# Patient Record
Sex: FEMALE | Race: WHITE | NOT HISPANIC OR LATINO | Employment: FULL TIME | URBAN - METROPOLITAN AREA
[De-identification: names, ages, dates, MRNs, and addresses within clinical notes are randomized per-mention and may not be internally consistent; named-entity substitution may affect disease eponyms.]

---

## 2018-03-20 ENCOUNTER — OFFICE VISIT (OUTPATIENT)
Dept: ENDOCRINOLOGY | Facility: CLINIC | Age: 38
End: 2018-03-20
Payer: OTHER GOVERNMENT

## 2018-03-20 VITALS
WEIGHT: 163.25 LBS | DIASTOLIC BLOOD PRESSURE: 76 MMHG | SYSTOLIC BLOOD PRESSURE: 110 MMHG | HEART RATE: 74 BPM | BODY MASS INDEX: 26.24 KG/M2 | HEIGHT: 66 IN

## 2018-03-20 DIAGNOSIS — E27.1 ADDISON'S DISEASE: ICD-10-CM

## 2018-03-20 DIAGNOSIS — Z46.81 INSULIN PUMP FITTING OR ADJUSTMENT: ICD-10-CM

## 2018-03-20 DIAGNOSIS — Z96.41 INSULIN PUMP STATUS: ICD-10-CM

## 2018-03-20 DIAGNOSIS — E10.8 TYPE 1 DIABETES MELLITUS WITH COMPLICATION: Primary | ICD-10-CM

## 2018-03-20 DIAGNOSIS — T38.0X5A ADVERSE EFFECT OF CORTICOSTEROIDS, INITIAL ENCOUNTER: ICD-10-CM

## 2018-03-20 PROCEDURE — 99999 PR PBB SHADOW E&M-NEW PATIENT-LVL IV: CPT | Mod: PBBFAC,,, | Performed by: NURSE PRACTITIONER

## 2018-03-20 PROCEDURE — 95251 CONT GLUC MNTR ANALYSIS I&R: CPT | Mod: ,,, | Performed by: NURSE PRACTITIONER

## 2018-03-20 PROCEDURE — 99204 OFFICE O/P NEW MOD 45 MIN: CPT | Mod: PBBFAC,PO | Performed by: NURSE PRACTITIONER

## 2018-03-20 PROCEDURE — 99204 OFFICE O/P NEW MOD 45 MIN: CPT | Mod: 25,S$PBB,, | Performed by: NURSE PRACTITIONER

## 2018-03-20 RX ORDER — SYRINGE WITH NEEDLE, 1 ML 25GX5/8"
SYRINGE, EMPTY DISPOSABLE MISCELLANEOUS
COMMUNITY
Start: 2017-12-19

## 2018-03-20 RX ORDER — HYDROCORTISONE 10 MG/1
TABLET ORAL
Qty: 360 TABLET | Refills: 3 | Status: SHIPPED | OUTPATIENT
Start: 2018-03-20 | End: 2018-11-30 | Stop reason: SDUPTHER

## 2018-03-20 RX ORDER — FLUDROCORTISONE ACETATE 0.1 MG/1
TABLET ORAL
Qty: 120 TABLET | Refills: 3 | Status: SHIPPED | OUTPATIENT
Start: 2018-03-20 | End: 2018-03-23 | Stop reason: SDUPTHER

## 2018-03-20 RX ORDER — FLUDROCORTISONE ACETATE 0.1 MG/1
TABLET ORAL
COMMUNITY
Start: 2017-12-19 | End: 2018-03-20 | Stop reason: SDUPTHER

## 2018-03-20 RX ORDER — LIRAGLUTIDE 6 MG/ML
INJECTION SUBCUTANEOUS
COMMUNITY
Start: 2018-03-12 | End: 2018-03-20 | Stop reason: SDUPTHER

## 2018-03-20 RX ORDER — LIRAGLUTIDE 6 MG/ML
1.8 INJECTION SUBCUTANEOUS DAILY
Qty: 9 SYRINGE | Refills: 3 | Status: SHIPPED | OUTPATIENT
Start: 2018-03-20 | End: 2018-11-30 | Stop reason: SDUPTHER

## 2018-03-20 RX ORDER — INSULIN ASPART 100 [IU]/ML
INJECTION, SOLUTION INTRAVENOUS; SUBCUTANEOUS
Qty: 6 VIAL | Refills: 3 | Status: SHIPPED | OUTPATIENT
Start: 2018-03-20 | End: 2018-11-30 | Stop reason: SDUPTHER

## 2018-03-20 RX ORDER — HYDROCORTISONE 10 MG/1
TABLET ORAL
COMMUNITY
Start: 2018-01-10 | End: 2018-03-20 | Stop reason: SDUPTHER

## 2018-03-20 RX ORDER — INSULIN ASPART 100 [IU]/ML
INJECTION, SOLUTION INTRAVENOUS; SUBCUTANEOUS
COMMUNITY
Start: 2018-01-10 | End: 2018-03-20 | Stop reason: SDUPTHER

## 2018-03-20 RX ORDER — PEN NEEDLE, DIABETIC 32GX 5/32"
NEEDLE, DISPOSABLE MISCELLANEOUS
COMMUNITY
Start: 2018-03-12

## 2018-03-20 NOTE — PROGRESS NOTES
Subjective:       Patient ID: Princess Garza is a 37 y.o. female.    Chief Complaint: Diabetes    HPI  New pt here to establish care. Pt is a 37 y.o. WF  with a diagnosis of Type 1 diabetes mellitus diagnosed approximately  1985 at age 5 yrs old , as well as chronic conditions pending review including Addisons disease .  Other pertinent medical and social information noted includes, but not limited to: none. Pt does where an Omnipod  Pump and a Dexcom sensor.  3 small children  (6, 3 and 6 mo).  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     Pt does not do formal carb counting.  She eats very low carbs so may only give herself 2 or 3 units plus a correction--so not sure what her CHO is.  However, she believes her ISF is closer to 1;20 vs the expected 1:45 based on projected TDD.  She is on both florinef and hydrocortisone for Addisons.  The hydro is given based on circadian rhythms--so she is basically getting pulses at different times of the day--which is not helping glucose patterns.  Very pleasant, high functioning.  Insulin pump and sensor both downloaded and reviewed.              Pump Model:Omni pod     Preferred Infusion Sets and Tubing:pods   Frequency of glucose checks a day:sensor BID and prn   Frequency of infusion set changes required:q 3 days    Average amount of daily insulin used:60     Basal rates:  12 mid----1 u/hr   4 am-------1.250  7 am-------0.85     CHO:none   ISF: 0   IOB: 4 hrs     Glucometer required to optimize pump function:Freestyle Regualr     Recommended back up plan in event of insulin pump hiatus: 25 units of basal-- rapid action 2-5 units with meals.         Review of Systems   Constitutional: Negative for activity change and fatigue.   HENT: Negative for hearing loss and trouble swallowing.    Eyes: Negative for photophobia and visual disturbance.        Last Eye Exam: April 2017   Respiratory: Negative for cough and shortness of breath.    Cardiovascular: Negative for chest pain and  palpitations.   Gastrointestinal: Negative for constipation and diarrhea.   Genitourinary: Negative for frequency and urgency.   Musculoskeletal: Negative for arthralgias and myalgias.   Skin: Negative for rash and wound.   Allergic/Immunologic: Negative for food allergies.   Neurological: Negative for weakness and numbness.   Psychiatric/Behavioral: Negative for sleep disturbance. The patient is not nervous/anxious.        Objective:      Physical Exam   Constitutional: She is oriented to person, place, and time. She appears well-developed and well-nourished.   Appears age, well groomed. NAD    HENT:   Head: Normocephalic and atraumatic.   Nose: Nose normal.   Mouth/Throat: Oropharynx is clear and moist.   Eyes: Conjunctivae and EOM are normal. Pupils are equal, round, and reactive to light.   Neck: Normal range of motion. Neck supple. No tracheal deviation present. No thyromegaly present.   Cardiovascular: Normal rate, regular rhythm, normal heart sounds and intact distal pulses.    Pulmonary/Chest: Effort normal and breath sounds normal.   Musculoskeletal: Normal range of motion. She exhibits no deformity.   Feet: no open wounds or lesions.  Good pedal care.   Vibratory sensation to feet intact bilaterally.    Neurological: She is alert and oriented to person, place, and time.   Skin: Skin is warm and dry.   Psychiatric: She has a normal mood and affect. Her behavior is normal. Judgment and thought content normal.       Assessment:       1. Type 1 diabetes mellitus with complication  Comprehensive metabolic panel    Hemoglobin A1c    Lipid panel    Microalbumin/creatinine urine ratio    TSH    HM DIABETES FOOT EXAM    Ambulatory Referral to Diabetes Education    VICTOZA 2-KAVON 0.6 mg/0.1 mL (18 mg/3 mL) PnIj    fludrocortisone (FLORINEF) 0.1 mg Tab    NOVOLOG U-100 INSULIN ASPART 100 unit/mL injection    hydrocortisone (CORTEF) 10 MG Tab      Hemoglobin A1c  Chronic-uncontrolled-see plan    2. Jeremias's disease   -chronic-refilled steroids-refer to Dr. Salinas    3. Adverse effect of corticosteroids, initial encounter  -chronic-causes labilitie in patterns    4. Insulin pump fitting or adjustment     5. Insulin pump status         Plan:       Encourage carb counting--if not an option encouraged some fixed meals.  Suspect she needs several basals based on the steroid pulses she is getting.      No CHO in pump--should have one.      NO ISF in pump ---should have one.      IOB 4 hrs--likely needs 3.     Also review Glucagon use.        ORDERS 03/20/2018  Next available--fasting cmp, lipids, tsh, a1c, urine m/c   Me in 6 weeks--pump and sensor download.     4 mo with me with a1c prior.   F/u Dr. Salinas-- approx 5-6 mo is fine.  A1c, cmp prior.

## 2018-03-22 ENCOUNTER — LAB VISIT (OUTPATIENT)
Dept: LAB | Facility: HOSPITAL | Age: 38
End: 2018-03-22
Attending: NURSE PRACTITIONER
Payer: OTHER GOVERNMENT

## 2018-03-22 DIAGNOSIS — E10.8 TYPE 1 DIABETES MELLITUS WITH COMPLICATION: ICD-10-CM

## 2018-03-22 LAB
ALBUMIN SERPL BCP-MCNC: 4.1 G/DL
ALP SERPL-CCNC: 74 U/L
ALT SERPL W/O P-5'-P-CCNC: 10 U/L
ANION GAP SERPL CALC-SCNC: 7 MMOL/L
AST SERPL-CCNC: 16 U/L
BILIRUB SERPL-MCNC: 0.7 MG/DL
BUN SERPL-MCNC: 13 MG/DL
CALCIUM SERPL-MCNC: 10.5 MG/DL
CHLORIDE SERPL-SCNC: 103 MMOL/L
CHOLEST SERPL-MCNC: 294 MG/DL
CHOLEST/HDLC SERPL: 4.3 {RATIO}
CO2 SERPL-SCNC: 29 MMOL/L
CREAT SERPL-MCNC: 0.9 MG/DL
EST. GFR  (AFRICAN AMERICAN): >60 ML/MIN/1.73 M^2
EST. GFR  (NON AFRICAN AMERICAN): >60 ML/MIN/1.73 M^2
ESTIMATED AVG GLUCOSE: 171 MG/DL
GLUCOSE SERPL-MCNC: 77 MG/DL
HBA1C MFR BLD HPLC: 7.6 %
HDLC SERPL-MCNC: 69 MG/DL
HDLC SERPL: 23.5 %
LDLC SERPL CALC-MCNC: 209 MG/DL
NONHDLC SERPL-MCNC: 225 MG/DL
POTASSIUM SERPL-SCNC: 4.3 MMOL/L
PROT SERPL-MCNC: 7.2 G/DL
SODIUM SERPL-SCNC: 139 MMOL/L
TRIGL SERPL-MCNC: 80 MG/DL
TSH SERPL DL<=0.005 MIU/L-ACNC: 0.79 UIU/ML

## 2018-03-22 PROCEDURE — 84443 ASSAY THYROID STIM HORMONE: CPT

## 2018-03-22 PROCEDURE — 80061 LIPID PANEL: CPT

## 2018-03-22 PROCEDURE — 80053 COMPREHEN METABOLIC PANEL: CPT

## 2018-03-22 PROCEDURE — 83036 HEMOGLOBIN GLYCOSYLATED A1C: CPT

## 2018-03-22 PROCEDURE — 36415 COLL VENOUS BLD VENIPUNCTURE: CPT | Mod: PO

## 2018-03-23 ENCOUNTER — PATIENT MESSAGE (OUTPATIENT)
Dept: ENDOCRINOLOGY | Facility: CLINIC | Age: 38
End: 2018-03-23

## 2018-03-23 DIAGNOSIS — E10.8 TYPE 1 DIABETES MELLITUS WITH COMPLICATION: ICD-10-CM

## 2018-03-23 RX ORDER — FLUDROCORTISONE ACETATE 0.1 MG/1
TABLET ORAL
Qty: 120 TABLET | Refills: 3 | Status: SHIPPED | OUTPATIENT
Start: 2018-03-23 | End: 2018-11-30 | Stop reason: SDUPTHER

## 2018-08-27 ENCOUNTER — PATIENT MESSAGE (OUTPATIENT)
Dept: ENDOCRINOLOGY | Facility: CLINIC | Age: 38
End: 2018-08-27

## 2018-08-27 ENCOUNTER — TELEPHONE (OUTPATIENT)
Dept: ENDOCRINOLOGY | Facility: CLINIC | Age: 38
End: 2018-08-27

## 2018-08-27 NOTE — TELEPHONE ENCOUNTER
Dr. Salinas, pt will be seeing you for [place]'s on 9/13/18 (Ro's pt).  Do you want labs prior to visit?

## 2018-08-27 NOTE — TELEPHONE ENCOUNTER
----- Message from Flora Bar sent at 8/27/2018  9:22 AM CDT -----  Contact: self  Type:  Patient Returning Call    Who Called:  self  Who Left Message for Patient:  Muna  Does the patient know what this is regarding?:  yes  Best Call Back Number:  [phone]  Additional Information:  Patient states she wants to reschedule her appointment. Patient states the date they gave her is passed her surgery date and needs to be before it. Thanks!

## 2018-08-27 NOTE — TELEPHONE ENCOUNTER
LM returning pt's call.  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  Call back to reschedule.

## 2018-08-27 NOTE — TELEPHONE ENCOUNTER
----- Message from Hillary Dominguez sent at 8/24/2018  4:39 PM CDT -----  Contact: pt  Pt is requesting to speak with nurse Muna Castellanos whom rescheduled her appt   Pt stated she won't be able to make the appt on 10-11-18 because she is having surgery on the 1st of October  Please call pt to advise  Call  Back   Thanks

## 2018-08-27 NOTE — TELEPHONE ENCOUNTER
----- Message from Michael Coulter sent at 8/27/2018  2:27 PM CDT -----  Contact: same  Unsuccessful call placed to office.  Patient called in and stated she keeps playing phone tag with office about her appt on 10/11/18?     Patient call back is [phone]

## 2018-09-13 ENCOUNTER — OFFICE VISIT (OUTPATIENT)
Dept: ENDOCRINOLOGY | Facility: CLINIC | Age: 38
End: 2018-09-13
Payer: OTHER GOVERNMENT

## 2018-09-13 VITALS
WEIGHT: 156.31 LBS | HEIGHT: 66 IN | BODY MASS INDEX: 25.12 KG/M2 | SYSTOLIC BLOOD PRESSURE: 118 MMHG | DIASTOLIC BLOOD PRESSURE: 60 MMHG | HEART RATE: 75 BPM

## 2018-09-13 DIAGNOSIS — E10.9 TYPE 1 DIABETES MELLITUS WITHOUT COMPLICATION: ICD-10-CM

## 2018-09-13 DIAGNOSIS — E27.1 ADDISON'S DISEASE: Primary | ICD-10-CM

## 2018-09-13 PROCEDURE — 99213 OFFICE O/P EST LOW 20 MIN: CPT | Mod: PBBFAC,PO | Performed by: INTERNAL MEDICINE

## 2018-09-13 PROCEDURE — 99214 OFFICE O/P EST MOD 30 MIN: CPT | Mod: S$PBB,,, | Performed by: INTERNAL MEDICINE

## 2018-09-13 PROCEDURE — 99999 PR PBB SHADOW E&M-EST. PATIENT-LVL III: CPT | Mod: PBBFAC,,, | Performed by: INTERNAL MEDICINE

## 2018-09-13 NOTE — PROGRESS NOTES
CHIEF COMPLAINT: Addisons  37 year old being seen as a new patient to me. Has been seeing Vivian for DM 1 management which was diagnosed at age 5. On Omnipod. On hydrocortisone 10-5-5-5-5 on avg. She adjusts also based on how she is feeling. On Florinef 0.1 mg daily. Addisons diagnosed in 2010.  Last hydrocortisone dose at 3 PM. No plans on other children. No N/V. States when it is low she gets palpitations. Does not have medic alert bracelet.             PAST MEDICAL HISTORY/PAST SURGICAL HISTORY:  Reviewed in [facility]    SOCIAL HISTORY: No T/A    FAMILY HISTORY:  Father has radiation induced hypothyroidism. Mother with hypothyroidism. No DM    MEDICATIONS/ALLERGIES: The patient's MedCard has been updated and reviewed.      ROS:   Constitutional: No recent significant weight change  Eyes: No recent visual changes  ENT: No dysphagia  Cardiovascular: Denies current anginal symptoms  Respiratory: Denies current respiratory difficulty  Gastrointestinal: Denies recent bowel disturbances  GenitoUrinary - No dysuria  Skin: No new skin rash  Neurologic: No focal neurologic complaints  Remainder ROS negative        PE:    GENERAL: Well developed, well nourished.  PSYCH:  appropriate mood and affect  EYES:  PERRL, EOM intact.  ENT: Nares patent, oropharynx clear, mucosa pink,   NECK: Supple, trachea midline, No palpable thyroid nodules.   CHEST: Resp even and unlabored, CTA bilateral.  CARDIAC: RRR, S1, S2 heard, no murmurs, rubs, S3, or S4  ABDOMEN: Soft, non-tender, non-distended;  No organomegaly  VASCULAR:  DP pulses +2/4 bilaterally, no edema  NEURO: Gait steady, CN II-VII grossly intact  SKIN: No areas of breakdown, no acanthosis nigracans.    LABS   Results for WALTER DIAZ (MRN [id number]) as of 9/13/2018 09:18   Ref. Range 3/22/2018 07:57 8/9/2018 11:23   Sodium Latest Ref Range: 136 - 145 mmol/L 139    Potassium Latest Ref Range: 3.5 - 5.1 mmol/L 4.3    Chloride Latest Ref Range: 95 - 110 mmol/L 103    CO2 Latest  Ref Range: 23 - 29 mmol/L 29    Anion Gap Latest Ref Range: 8 - 16 mmol/L 7 (L)    BUN, Bld Latest Ref Range: 6 - 20 mg/dL 13    Creatinine Latest Ref Range: 0.5 - 1.4 mg/dL 0.9    eGFR if non African American Latest Ref Range: >60 mL/min/1.73 m^2 >60.0    eGFR if African American Latest Ref Range: >60 mL/min/1.73 m^2 >60.0    Glucose Latest Ref Range: 70 - 110 mg/dL 77    Calcium Latest Ref Range: 8.7 - 10.5 mg/dL 10.5    Alkaline Phosphatase Latest Ref Range: 55 - 135 U/L 74    Total Protein Latest Ref Range: 6.0 - 8.4 g/dL 7.2    Albumin Latest Ref Range: 3.5 - 5.2 g/dL 4.1    Total Bilirubin Latest Ref Range: 0.1 - 1.0 mg/dL 0.7    AST Latest Ref Range: 10 - 40 U/L 16    ALT Latest Ref Range: 10 - 44 U/L 10    Triglycerides Latest Ref Range: 30 - 150 mg/dL 80    Cholesterol Latest Ref Range: 120 - 199 mg/dL 294 (H)    HDL Latest Ref Range: 40 - 75 mg/dL 69    LDL Cholesterol Latest Ref Range: 63.0 - 159.0 mg/dL 209.0 (H)    Total Cholesterol/HDL Ratio Latest Ref Range: 2.0 - 5.0  4.3    Hemoglobin A1C Latest Ref Range: 4.0 - 5.6 % 7.6 (H)    Estimated Avg Glucose Latest Ref Range: 68 - 131 mg/dL 171 (H)    TSH Latest Ref Range: 0.400 - 4.000 uIU/mL 0.786 0.481   T3, Free Latest Ref Range: 2.77 - 5.27 pg/mL  3.30   Free T4 Latest Ref Range: 0.78 - 2.19 ng/dL  1.07   Thyroperoxidase Antibodies Latest Ref Range: 0.0 - 9.0 IU/mL  0.8       ASSESSMENT/PLAN:  1. Adrenal Insufficiency- discussed that she is taking quite frequent doses of steroids. Could decrease to twice a day- either 20/10 or 15/5. Would also make it easier from a BG management standpoint. If twice a day did not work could go to 3/day dosing. Will do labs as seen below. Discussed wearing a medic alert bracelet. Discussed sick day precautions.     2. DM 1- following with Ro. Discussed r/o other autoimmune diseases      FOLLOWUP  8 AM renin, ACTH, IgA, TTG, CMP, CBC

## 2018-09-15 ENCOUNTER — LAB VISIT (OUTPATIENT)
Dept: LAB | Facility: HOSPITAL | Age: 38
End: 2018-09-15
Attending: INTERNAL MEDICINE
Payer: OTHER GOVERNMENT

## 2018-09-15 DIAGNOSIS — E27.1 ADDISON'S DISEASE: ICD-10-CM

## 2018-09-15 DIAGNOSIS — E10.9 TYPE 1 DIABETES MELLITUS WITHOUT COMPLICATION: ICD-10-CM

## 2018-09-15 LAB
ALBUMIN SERPL BCP-MCNC: 4.2 G/DL
ALP SERPL-CCNC: 67 U/L
ALT SERPL W/O P-5'-P-CCNC: 12 U/L
ANION GAP SERPL CALC-SCNC: 9 MMOL/L
AST SERPL-CCNC: 19 U/L
BASOPHILS # BLD AUTO: 0.03 K/UL
BASOPHILS NFR BLD: 0.6 %
BILIRUB SERPL-MCNC: 0.9 MG/DL
BUN SERPL-MCNC: 10 MG/DL
CALCIUM SERPL-MCNC: 9.8 MG/DL
CHLORIDE SERPL-SCNC: 99 MMOL/L
CO2 SERPL-SCNC: 28 MMOL/L
CREAT SERPL-MCNC: 0.9 MG/DL
DIFFERENTIAL METHOD: ABNORMAL
EOSINOPHIL # BLD AUTO: 0.1 K/UL
EOSINOPHIL NFR BLD: 1.3 %
ERYTHROCYTE [DISTWIDTH] IN BLOOD BY AUTOMATED COUNT: 12.9 %
EST. GFR  (AFRICAN AMERICAN): >60 ML/MIN/1.73 M^2
EST. GFR  (NON AFRICAN AMERICAN): >60 ML/MIN/1.73 M^2
GLUCOSE SERPL-MCNC: 129 MG/DL
HCT VFR BLD AUTO: 41.9 %
HGB BLD-MCNC: 13.7 G/DL
IGA SERPL-MCNC: 145 MG/DL
IMM GRANULOCYTES # BLD AUTO: 0.01 K/UL
IMM GRANULOCYTES NFR BLD AUTO: 0.2 %
LYMPHOCYTES # BLD AUTO: 2.3 K/UL
LYMPHOCYTES NFR BLD: 41.9 %
MCH RBC QN AUTO: 28.9 PG
MCHC RBC AUTO-ENTMCNC: 32.7 G/DL
MCV RBC AUTO: 88 FL
MONOCYTES # BLD AUTO: 0.6 K/UL
MONOCYTES NFR BLD: 10.2 %
NEUTROPHILS # BLD AUTO: 2.5 K/UL
NEUTROPHILS NFR BLD: 45.8 %
NRBC BLD-RTO: 0 /100 WBC
PLATELET # BLD AUTO: 365 K/UL
PMV BLD AUTO: 10.6 FL
POTASSIUM SERPL-SCNC: 3.7 MMOL/L
PROT SERPL-MCNC: 7.4 G/DL
RBC # BLD AUTO: 4.74 M/UL
SODIUM SERPL-SCNC: 136 MMOL/L
WBC # BLD AUTO: 5.4 K/UL

## 2018-09-15 PROCEDURE — 82024 ASSAY OF ACTH: CPT

## 2018-09-15 PROCEDURE — 83516 IMMUNOASSAY NONANTIBODY: CPT

## 2018-09-15 PROCEDURE — 85025 COMPLETE CBC W/AUTO DIFF WBC: CPT

## 2018-09-15 PROCEDURE — 36415 COLL VENOUS BLD VENIPUNCTURE: CPT | Mod: PO

## 2018-09-15 PROCEDURE — 82784 ASSAY IGA/IGD/IGG/IGM EACH: CPT

## 2018-09-15 PROCEDURE — 84244 ASSAY OF RENIN: CPT

## 2018-09-15 PROCEDURE — 80053 COMPREHEN METABOLIC PANEL: CPT

## 2018-09-18 LAB — ACTH PLAS-MCNC: 18 PG/ML

## 2018-09-19 LAB
RENIN PLAS-CCNC: 22 NG/ML/H
TTG IGA SER IA-ACNC: 4 UNITS

## 2018-09-27 ENCOUNTER — TELEPHONE (OUTPATIENT)
Dept: ENDOCRINOLOGY | Facility: CLINIC | Age: 38
End: 2018-09-27

## 2018-09-27 NOTE — TELEPHONE ENCOUNTER
Ro manages her Diabetes. Let me let her give the pump recs.     I can give steroid recommendations when I Get back.   Is there a form they want this on or just create documentation within epic

## 2018-09-27 NOTE — TELEPHONE ENCOUNTER
Dr Salinas pt is having an abdominal plasty with hernia repair on 10/10, pt will be in surgery for about 3 hours, please advise on cortef and pump instructions prior to surgery

## 2018-09-27 NOTE — TELEPHONE ENCOUNTER
----- Message from RT Tova sent at 9/27/2018 10:00 AM CDT -----  Contact: MARISA Del Real, [phone] [facility] Pre Admit Dept  MARISA Del Real, [phone] [facility] Pre Admit Dept, requesting surgery instructions for the pt's insulin pump and cortef dosing prior to Sx, thanks.

## 2018-09-27 NOTE — TELEPHONE ENCOUNTER
----- Message from Piyush Thompson sent at 9/27/2018 10:25 AM CDT -----  Contact: [facility], Eloisa Amin want to speak with a nurse regarding clarification on insulin pump before patient surgery please call back at [phone]

## 2018-09-27 NOTE — TELEPHONE ENCOUNTER
Just a message in epic will work  The Nurse for the surgeons office was advised I would call back Monday with your advise

## 2018-09-27 NOTE — TELEPHONE ENCOUNTER
----- Message from Star Prabhakar sent at 9/27/2018 11:09 AM CDT -----  Contact: Nasima with [facility]   Nasima with [facility] is returning a call from nurse Ramirez. Please call back at [phone]

## 2018-10-01 ENCOUNTER — TELEPHONE (OUTPATIENT)
Dept: ENDOCRINOLOGY | Facility: CLINIC | Age: 38
End: 2018-10-01

## 2018-10-01 NOTE — TELEPHONE ENCOUNTER
----- Message from Stan Diaz sent at 10/1/2018  3:21 PM CDT -----  Type: Needs Medical Advice    Who Called:  Cindi Martin's office  Best Call Back Number: [phone]  Additional Information: Procedure is Outpatient

## 2018-10-01 NOTE — TELEPHONE ENCOUNTER
Spoke with Joaquina, advised Ro Gayle will be making the adjustments, advised she will return on 10/2, will call back with orders once received

## 2018-10-01 NOTE — TELEPHONE ENCOUNTER
----- Message from Loraine White sent at 10/1/2018  1:47 PM CDT -----  Contact: Cindi Puente [phone] with Dr Peña Martin is calling to speak with nurse concerning this patient as she is saying that the nurse was going to call her back/patient has a preop appt tomorrow  10 02 18

## 2018-10-01 NOTE — TELEPHONE ENCOUNTER
----- Message from Flora Bar sent at 10/1/2018  2:20 PM CDT -----  Contact: Kristie Puente with Dr Das's office is calling back regarding patient about the instructions for the insulin pump. This is for outpatient surgery. Please call Cindi at [phone]. Thanks!

## 2018-10-02 RX ORDER — INSULIN GLARGINE 100 [IU]/ML
INJECTION, SOLUTION SUBCUTANEOUS
Qty: 1 BOX | Refills: 0 | Status: SHIPPED | OUTPATIENT
Start: 2018-10-02

## 2018-10-02 RX ORDER — PEN NEEDLE, DIABETIC 30 GX3/16"
NEEDLE, DISPOSABLE MISCELLANEOUS
Qty: 50 EACH | Refills: 0 | Status: SHIPPED | OUTPATIENT
Start: 2018-10-02

## 2018-10-02 NOTE — TELEPHONE ENCOUNTER
----- Message from Edwardopaul Aki sent at 10/2/2018 10:05 AM CDT -----  Contact: Cindi with Dr Mario Puente with Dr Martin office called, she need to speak with a nurse regarding mutual patient. Please call back at [phone]

## 2018-10-02 NOTE — TELEPHONE ENCOUNTER
Ro, please advise pump orders for pts pre op  Pt is having surgery 10/10. Dr office doing pre op this week

## 2018-10-02 NOTE — TELEPHONE ENCOUNTER
Please advise pt I do not want her wearing pump through surgery. This is a big surgery and I don't know how with it patient will be to operate pump--and I suspect a lot of adjustments will need to be done with stress steroids.      Therefore:    Pt to take 25 units of Lantus or Levemir qhs----starting the night before surgery--if she is concerned out taking 25, she can cut it down to 20 units--but 25 is still hedging on conservative side.    Once pt eating I advise 4 units of Novolog or Humalog with meals plus a ss --    Novolog/or Humalog/o USE ONLY    Dose is based on level of glucose before meals only (meaning no food or drink for 4 hours). This dose may be added to a standard meal dose if ordered    150-200=+2  201-250=+4  251-300=+6  301-350=+8  351-400=+10       I will send in rx for Lantus or Levemir,    (whatever is preferred to pharmacy)     In patient recommendations:    25 lantus or levermir qhs,   novolog or Humalog 4 units with meals plus ss.

## 2018-10-03 ENCOUNTER — TELEPHONE (OUTPATIENT)
Dept: ENDOCRINOLOGY | Facility: CLINIC | Age: 38
End: 2018-10-03

## 2018-10-03 NOTE — TELEPHONE ENCOUNTER
Dr Salinas, pt states she Is suppose to get 100mg IV on day of surgery q 8 hours  Please advise if this is ok

## 2018-10-03 NOTE — TELEPHONE ENCOUNTER
----- Message from Albertina Horvath sent at 10/3/2018  8:48 AM CDT -----    Patient  Is calling  To   Speak to the  Nurse about   Pt  Surgery  Next  Week , pt stated  She  Needs  Her  DR who will  Be  Doing the  surgery  instructions on insulin pump/   Pt  Stated  Written orders   Need to  Be sent // please call /[phone]  Pt will  Have  Information  About  Fax #

## 2018-10-03 NOTE — TELEPHONE ENCOUNTER
Spoke with Eloisa advised Dose of steroid day of surgery increased to 100mg Iv on call to Or and 8 hours post op.

## 2018-10-03 NOTE — TELEPHONE ENCOUNTER
Spoke with pt, states she is willing to have to higher blood sugars for a few days than have adrenal crisis. Pt preferred the 100mg dose on call to OR and 8 hours post op  Please advise if this is ok/

## 2018-10-03 NOTE — TELEPHONE ENCOUNTER
Let her know that the recommendations changed and they are suggesting decreased doses based on certain surgeries. I would prefer IV but if they are unable to give it there then she would have to get PO. The other option is that she can ask the surgeon if they can do it at a facility that can do IV.   If she still prefers 100 IV, that may be excessive but we can suggest that dose.

## 2018-10-10 PROBLEM — R11.0 NAUSEA: Status: ACTIVE | Noted: 2018-10-10

## 2018-10-10 PROBLEM — K42.9 UMBILICAL HERNIA WITHOUT OBSTRUCTION AND WITHOUT GANGRENE: Status: ACTIVE | Noted: 2018-10-10

## 2018-11-15 ENCOUNTER — LAB VISIT (OUTPATIENT)
Dept: LAB | Facility: HOSPITAL | Age: 38
End: 2018-11-15
Attending: NURSE PRACTITIONER
Payer: OTHER GOVERNMENT

## 2018-11-15 DIAGNOSIS — E27.1 ADDISON'S DISEASE: ICD-10-CM

## 2018-11-15 DIAGNOSIS — E10.8 TYPE 1 DIABETES MELLITUS WITH COMPLICATION: ICD-10-CM

## 2018-11-15 LAB
ALBUMIN SERPL BCP-MCNC: 4.1 G/DL
ALP SERPL-CCNC: 99 U/L
ALT SERPL W/O P-5'-P-CCNC: 9 U/L
ANION GAP SERPL CALC-SCNC: 6 MMOL/L
AST SERPL-CCNC: 15 U/L
BILIRUB SERPL-MCNC: 0.6 MG/DL
BUN SERPL-MCNC: 14 MG/DL
CALCIUM SERPL-MCNC: 9.4 MG/DL
CHLORIDE SERPL-SCNC: 101 MMOL/L
CO2 SERPL-SCNC: 29 MMOL/L
CREAT SERPL-MCNC: 0.8 MG/DL
EST. GFR  (AFRICAN AMERICAN): >60 ML/MIN/1.73 M^2
EST. GFR  (NON AFRICAN AMERICAN): >60 ML/MIN/1.73 M^2
ESTIMATED AVG GLUCOSE: 157 MG/DL
GLUCOSE SERPL-MCNC: 178 MG/DL
HBA1C MFR BLD HPLC: 7.1 %
POTASSIUM SERPL-SCNC: 4.2 MMOL/L
PROT SERPL-MCNC: 7.7 G/DL
SODIUM SERPL-SCNC: 136 MMOL/L

## 2018-11-15 PROCEDURE — 80053 COMPREHEN METABOLIC PANEL: CPT

## 2018-11-15 PROCEDURE — 36415 COLL VENOUS BLD VENIPUNCTURE: CPT | Mod: PO

## 2018-11-15 PROCEDURE — 83036 HEMOGLOBIN GLYCOSYLATED A1C: CPT

## 2018-11-30 ENCOUNTER — OFFICE VISIT (OUTPATIENT)
Dept: ENDOCRINOLOGY | Facility: CLINIC | Age: 38
End: 2018-11-30
Payer: OTHER GOVERNMENT

## 2018-11-30 VITALS
WEIGHT: 158.75 LBS | BODY MASS INDEX: 25.51 KG/M2 | HEIGHT: 66 IN | DIASTOLIC BLOOD PRESSURE: 72 MMHG | RESPIRATION RATE: 18 BRPM | SYSTOLIC BLOOD PRESSURE: 112 MMHG

## 2018-11-30 DIAGNOSIS — E10.8 TYPE 1 DIABETES MELLITUS WITH COMPLICATION: Primary | ICD-10-CM

## 2018-11-30 DIAGNOSIS — T38.0X5A ADRENAL CORTICAL STEROIDS CAUSING ADVERSE EFFECT IN THERAPEUTIC USE: ICD-10-CM

## 2018-11-30 DIAGNOSIS — E78.5 HYPERLIPIDEMIA, UNSPECIFIED HYPERLIPIDEMIA TYPE: ICD-10-CM

## 2018-11-30 DIAGNOSIS — Z46.81 INSULIN PUMP FITTING OR ADJUSTMENT: ICD-10-CM

## 2018-11-30 DIAGNOSIS — E27.1 ADDISON'S DISEASE: ICD-10-CM

## 2018-11-30 PROCEDURE — 99214 OFFICE O/P EST MOD 30 MIN: CPT | Mod: PBBFAC,PO | Performed by: NURSE PRACTITIONER

## 2018-11-30 PROCEDURE — 95251 CONT GLUC MNTR ANALYSIS I&R: CPT | Mod: ,,, | Performed by: NURSE PRACTITIONER

## 2018-11-30 PROCEDURE — 99212 OFFICE O/P EST SF 10 MIN: CPT | Mod: 25,S$PBB,, | Performed by: NURSE PRACTITIONER

## 2018-11-30 PROCEDURE — 99999 PR PBB SHADOW E&M-EST. PATIENT-LVL IV: CPT | Mod: PBBFAC,,, | Performed by: NURSE PRACTITIONER

## 2018-11-30 RX ORDER — INSULIN ASPART 100 [IU]/ML
INJECTION, SOLUTION INTRAVENOUS; SUBCUTANEOUS
Qty: 6 VIAL | Refills: 3 | Status: SHIPPED | OUTPATIENT
Start: 2018-11-30

## 2018-11-30 RX ORDER — HYDROCORTISONE 10 MG/1
TABLET ORAL
Qty: 360 TABLET | Refills: 3 | Status: SHIPPED | OUTPATIENT
Start: 2018-11-30 | End: 2023-06-27 | Stop reason: DRUGHIGH

## 2018-11-30 RX ORDER — FLUDROCORTISONE ACETATE 0.1 MG/1
TABLET ORAL
Qty: 120 TABLET | Refills: 3 | Status: SHIPPED | OUTPATIENT
Start: 2018-11-30 | End: 2018-11-30 | Stop reason: SDUPTHER

## 2018-11-30 RX ORDER — FLUDROCORTISONE ACETATE 0.1 MG/1
TABLET ORAL
Qty: 120 TABLET | Refills: 3 | Status: SHIPPED | OUTPATIENT
Start: 2018-11-30

## 2018-11-30 RX ORDER — HYDROCORTISONE 10 MG/1
TABLET ORAL
Qty: 360 TABLET | Refills: 3 | Status: SHIPPED | OUTPATIENT
Start: 2018-11-30 | End: 2018-11-30 | Stop reason: SDUPTHER

## 2018-11-30 RX ORDER — INSULIN ASPART 100 [IU]/ML
INJECTION, SOLUTION INTRAVENOUS; SUBCUTANEOUS
Qty: 6 VIAL | Refills: 3 | Status: SHIPPED | OUTPATIENT
Start: 2018-11-30 | End: 2018-11-30 | Stop reason: SDUPTHER

## 2018-11-30 RX ORDER — LIRAGLUTIDE 6 MG/ML
1.8 INJECTION SUBCUTANEOUS DAILY
Qty: 9 SYRINGE | Refills: 3 | Status: SHIPPED | OUTPATIENT
Start: 2018-11-30 | End: 2019-05-09 | Stop reason: ALTCHOICE

## 2018-11-30 RX ORDER — LIRAGLUTIDE 6 MG/ML
1.8 INJECTION SUBCUTANEOUS DAILY
Qty: 9 SYRINGE | Refills: 3 | Status: SHIPPED | OUTPATIENT
Start: 2018-11-30 | End: 2018-11-30 | Stop reason: SDUPTHER

## 2018-11-30 NOTE — PROGRESS NOTES
Subjective:       Patient ID: Princess Garza is a 38 y.o. female.    Chief Complaint: Follow-up (3 month/ labs prior)    HPI  Pt is a 38 y.o. WF  with a diagnosis of Type 1 diabetes mellitus diagnosed approximately  1985 at age 5 yrs old , as well as chronic conditions pending review including Addisons disease .  Other pertinent medical and social information noted includes, but not limited to: none. Pt does where an Omnipod  Pump and a Dexcom sensor.  3 small children  (6, 3 and 6 mo).  [employer] at [employer].     Pt does not do formal carb counting.  She eats very low carbs so may only give herself 2 or 3 units plus a correction--She does not use the bolus wizard in pump--nor are the cho or ISF entered in the pump.  At last visit I wanted pt pt come back in a few weeks to review logs and optimize these settings but she did not.  Primarily had a lot of personal issues, including a hernia surgery which interfered with f/u visits. Dexcom sensor is downloaded.  Overall, numbers are fairly smooth, especially considering freq steroids for Addisons'   She is on both florinef and hydrocortisone for Addisons.  The hydro is given based on circadian rhythms--so she is basically getting pulses at different times of the day--which is not helping glucose patterns.  Very pleasant, high functioning.  Insulin pump and sensor both downloaded and reviewed.        See Media todays date.       Pump Model:Omni pod     Preferred Infusion Sets and Tubing:pods   Frequency of glucose checks a day:sensor BID and prn   Frequency of infusion set changes required:q 3 days    Average amount of daily insulin used:60     Basal rates:  12 mid----1 u/hr   4 am-------1.250  7 am-------0.85     CHO:none   ISF: 0   IOB: 4 hrs     Glucometer required to optimize pump function:Freestyle Regualr     Recommended back up plan in event of insulin pump hiatus: 25 units of basal-- rapid action 2-5 units with meals.         Review of Systems    Constitutional: Negative for activity change and fatigue.   HENT: Negative for hearing loss and trouble swallowing.    Eyes: Negative for photophobia and visual disturbance.        Last Eye Exam: April 2017   Respiratory: Negative for cough and shortness of breath.    Cardiovascular: Negative for chest pain and palpitations.   Gastrointestinal: Negative for constipation and diarrhea.   Genitourinary: Negative for frequency and urgency.   Musculoskeletal: Negative for arthralgias and myalgias.   Skin: Negative for rash and wound.   Allergic/Immunologic: Negative for food allergies.   Neurological: Negative for weakness and numbness.   Psychiatric/Behavioral: Negative for sleep disturbance. The patient is not nervous/anxious.        Objective:      Physical Exam   Constitutional: She is oriented to person, place, and time. She appears well-developed and well-nourished.   Appears age, well groomed. NAD    HENT:   Head: Normocephalic and atraumatic.   Nose: Nose normal.   Mouth/Throat: Oropharynx is clear and moist.   Eyes: Conjunctivae and EOM are normal. Pupils are equal, round, and reactive to light.   Neck: Normal range of motion. Neck supple. No tracheal deviation present. No thyromegaly present.   Cardiovascular: Normal rate, regular rhythm, normal heart sounds and intact distal pulses.   Pulmonary/Chest: Effort normal and breath sounds normal.   Musculoskeletal: Normal range of motion. She exhibits no deformity.   Feet:    Neurological: She is alert and oriented to person, place, and time.   Skin: Skin is warm and dry.   Psychiatric: She has a normal mood and affect. Her behavior is normal. Judgment and thought content normal.       Hemoglobin A1C   Date Value Ref Range Status   11/15/2018 7.1 (H) 4.0 - 5.6 % Final     Comment:     ADA Screening Guidelines:  5.7-6.4%  Consistent with prediabetes  >or=6.5%  Consistent with diabetes  High levels of fetal hemoglobin interfere with the HbA1C  assay. Heterozygous  hemoglobin variants (HbS, HgC, etc)do  not significantly interfere with this assay.   However, presence of multiple variants may affect accuracy.     09/27/2018 8.0 (H) 0.0 - 5.6 % Final     Comment:     Reference Interval:  5.0 - 5.6 Normal   5.7 - 6.4 High Risk   > 6.5 Diabetic    Hgb A1c results are standardized based on the (NGSP) National   Glycohemoglobin Standardization Program.    Hemoglobin A1C levels are related to mean serum/plasma glucose   during the preceding 2-3 months.        03/22/2018 7.6 (H) 4.0 - 5.6 % Final     Comment:     According to ADA guidelines, hemoglobin A1c <7.0% represents  optimal control in non-pregnant diabetic patients. Different  metrics may apply to specific patient populations.   Standards of Medical Care in Diabetes-2016.  For the purpose of screening for the presence of diabetes:  <5.7%     Consistent with the absence of diabetes  5.7-6.4%  Consistent with increasing risk for diabetes   (prediabetes)  >or=6.5%  Consistent with diabetes  Currently, no consensus exists for use of hemoglobin A1c  for diagnosis of diabetes for children.  This Hemoglobin A1c assay has significant interference with fetal   hemoglobin   (HbF). The results are invalid for patients with abnormal amounts of   HbF,   including those with known Hereditary Persistence   of Fetal Hemoglobin. Heterozygous hemoglobin variants (HbAS, HbAC,   HbAD, HbAE, HbA2) do not significantly interfere with this assay;   however, presence of multiple variants in a sample may impact the %   interference.         Chemistry        Component Value Date/Time     11/15/2018 1011    K 4.2 11/15/2018 1011     11/15/2018 1011    CO2 29 11/15/2018 1011    BUN 14 11/15/2018 1011    CREATININE 0.8 11/15/2018 1011     (H) 11/15/2018 1011        Component Value Date/Time    CALCIUM 9.4 11/15/2018 1011    ALKPHOS 99 11/15/2018 1011    AST 15 11/15/2018 1011    ALT 9 (L) 11/15/2018 1011    BILITOT 0.6 11/15/2018 1011     ESTGFRAFRICA >60.0 11/15/2018 1011    EGFRNONAA >60.0 11/15/2018 1011        Lab Results   Component Value Date    LDLCALC 209.0 (H) 03/22/2018     Lab Results   Component Value Date    TSH 0.481 08/09/2018       Assessment:       1. Type 1 diabetes mellitus with complication  Hemoglobin A1c    Lipid panel     DIABETES FOOT EXAM    blood sugar diagnostic Strp    fludrocortisone (FLORINEF) 0.1 mg Tab    hydrocortisone (CORTEF) 10 MG Tab    NOVOLOG U-100 INSULIN ASPART 100 unit/mL injection    VICTOZA 2-KAVON 0.6 mg/0.1 mL (18 mg/3 mL) PnIj    DISCONTINUED: blood sugar diagnostic Strp    DISCONTINUED: VICTOZA 2-KAVON 0.6 mg/0.1 mL (18 mg/3 mL) PnIj    DISCONTINUED: NOVOLOG U-100 INSULIN ASPART 100 unit/mL injection    DISCONTINUED: fludrocortisone (FLORINEF) 0.1 mg Tab    DISCONTINUED: hydrocortisone (CORTEF) 10 MG Tab  Chronic-stable-no change    2. Insulin pump fitting or adjustment     3. Adrenal cortical steroids causing adverse effect in therapeutic use  hydrocortisone sodium succinate (SOLU-CORTEF) 100 mg SolR    DISCONTINUED: hydrocortisone sodium succinate (SOLU-CORTEF) 100 mg SolR   4. Baker's disease  Chronic-no changes-f/u Dr. Salinas   5. Hyperlipidemia, unspecified hyperlipidemia type  Chronic-uncontrolled-see plan          Plan:            No CHO in pump--should have one.      NO ISF in pump ---should have one.      IOB 4 hrs--likely needs 3.       Strongly encourace ACE for prophylaxis and absolutely a statin. But will recheck chem and Lipids again next visit.        ORDERS 11/30/2018     F/u Dr. Salinas--next available--labs pending     F/u me in 6 mo w/ fasting lipids, a1c prior

## 2019-03-15 ENCOUNTER — TELEPHONE (OUTPATIENT)
Dept: ENDOCRINOLOGY | Facility: CLINIC | Age: 39
End: 2019-03-15

## 2019-03-15 DIAGNOSIS — E83.52 HYPERCALCEMIA: Primary | ICD-10-CM

## 2019-03-15 NOTE — TELEPHONE ENCOUNTER
Let her know that I reviewed the labs. The calcium was slightly elevated    Have her do a CMP, PTH, Phos

## 2019-03-15 NOTE — TELEPHONE ENCOUNTER
----- Message from Cuba Lane sent at 3/15/2019  1:33 PM CDT -----  Pt stopped by clinic and stated she had labs done recently that her PCP advised you view for guidance

## 2019-03-15 NOTE — TELEPHONE ENCOUNTER
spoke with pt who verbalized understanding of Dr. Salinas's message:  Let her know that I reviewed the labs. The calcium was slightly elevated     Have her do a CMP, PTH, Phos    Pt will do labs either tomorrow at [facility] or at [facility] lab

## 2019-04-01 DIAGNOSIS — T38.0X5A ADRENAL CORTICAL STEROIDS CAUSING ADVERSE EFFECT IN THERAPEUTIC USE: ICD-10-CM

## 2019-04-01 NOTE — TELEPHONE ENCOUNTER
----- Message from Ashley Farrell sent at 4/1/2019 11:49 AM CDT -----  Contact: Patient  Patient needs to speak with someone in the office possibly today regarding a medication  hydrocortisone sodium succinate (SOLU-CORTEF) 100 mg SolR    Please contact patient to advise [phone] (home)

## 2019-04-16 ENCOUNTER — TELEPHONE (OUTPATIENT)
Dept: ENDOCRINOLOGY | Facility: CLINIC | Age: 39
End: 2019-04-16

## 2019-04-16 NOTE — TELEPHONE ENCOUNTER
Spoke with pt, rescheduled labs and office visit due to work conflict, aware of new date time and locations

## 2019-04-16 NOTE — TELEPHONE ENCOUNTER
----- Message from Alisa Bocanegra sent at 4/16/2019 12:31 PM CDT -----  Contact: Princess  Type: Needs Medical Advice    Who Called:  patient  Best Call Back Number: [phone]  Additional Information: patient has to go out of town for work & can't make her appt on 5/16--would like to speak with someone before r/s--please advise--thank you

## 2019-05-07 ENCOUNTER — LAB VISIT (OUTPATIENT)
Dept: LAB | Facility: HOSPITAL | Age: 39
End: 2019-05-07
Attending: NURSE PRACTITIONER
Payer: OTHER GOVERNMENT

## 2019-05-07 DIAGNOSIS — E10.8 TYPE 1 DIABETES MELLITUS WITH COMPLICATION: ICD-10-CM

## 2019-05-07 LAB
CHOLEST SERPL-MCNC: 240 MG/DL (ref 120–199)
CHOLEST/HDLC SERPL: 3.3 {RATIO} (ref 2–5)
ESTIMATED AVG GLUCOSE: 174 MG/DL (ref 68–131)
HBA1C MFR BLD HPLC: 7.7 % (ref 4–5.6)
HDLC SERPL-MCNC: 73 MG/DL (ref 40–75)
HDLC SERPL: 30.4 % (ref 20–50)
LDLC SERPL CALC-MCNC: 150.2 MG/DL (ref 63–159)
NONHDLC SERPL-MCNC: 167 MG/DL
TRIGL SERPL-MCNC: 84 MG/DL (ref 30–150)

## 2019-05-07 PROCEDURE — 80061 LIPID PANEL: CPT

## 2019-05-07 PROCEDURE — 36415 COLL VENOUS BLD VENIPUNCTURE: CPT | Mod: PO

## 2019-05-07 PROCEDURE — 83036 HEMOGLOBIN GLYCOSYLATED A1C: CPT

## 2019-05-09 ENCOUNTER — OFFICE VISIT (OUTPATIENT)
Dept: ENDOCRINOLOGY | Facility: CLINIC | Age: 39
End: 2019-05-09
Payer: OTHER GOVERNMENT

## 2019-05-09 ENCOUNTER — TELEPHONE (OUTPATIENT)
Dept: ENDOCRINOLOGY | Facility: CLINIC | Age: 39
End: 2019-05-09

## 2019-05-09 VITALS
HEART RATE: 74 BPM | WEIGHT: 163.5 LBS | BODY MASS INDEX: 26.28 KG/M2 | DIASTOLIC BLOOD PRESSURE: 70 MMHG | HEIGHT: 66 IN | SYSTOLIC BLOOD PRESSURE: 104 MMHG

## 2019-05-09 DIAGNOSIS — Z46.81 INSULIN PUMP FITTING OR ADJUSTMENT: ICD-10-CM

## 2019-05-09 DIAGNOSIS — R53.82 CHRONIC FATIGUE: ICD-10-CM

## 2019-05-09 DIAGNOSIS — E27.1 ADDISON'S DISEASE: ICD-10-CM

## 2019-05-09 DIAGNOSIS — Z96.41 INSULIN PUMP STATUS: ICD-10-CM

## 2019-05-09 DIAGNOSIS — E78.5 DYSLIPIDEMIA: ICD-10-CM

## 2019-05-09 DIAGNOSIS — E10.8 TYPE 1 DIABETES MELLITUS WITH COMPLICATION: Primary | ICD-10-CM

## 2019-05-09 DIAGNOSIS — T38.0X5A ADRENAL CORTICAL STEROIDS CAUSING ADVERSE EFFECT IN THERAPEUTIC USE: ICD-10-CM

## 2019-05-09 PROCEDURE — 99215 PR OFFICE/OUTPT VISIT, EST, LEVL V, 40-54 MIN: ICD-10-PCS | Mod: S$PBB,,, | Performed by: NURSE PRACTITIONER

## 2019-05-09 PROCEDURE — 95251 PR GLUCOSE MONITOR, 72 HOUR, PHYS INTERP: ICD-10-PCS | Mod: ,,, | Performed by: NURSE PRACTITIONER

## 2019-05-09 PROCEDURE — 95251 CONT GLUC MNTR ANALYSIS I&R: CPT | Mod: ,,, | Performed by: NURSE PRACTITIONER

## 2019-05-09 PROCEDURE — 99215 OFFICE O/P EST HI 40 MIN: CPT | Mod: S$PBB,,, | Performed by: NURSE PRACTITIONER

## 2019-05-09 PROCEDURE — 99999 PR PBB SHADOW E&M-EST. PATIENT-LVL IV: CPT | Mod: PBBFAC,,, | Performed by: NURSE PRACTITIONER

## 2019-05-09 PROCEDURE — 99999 PR PBB SHADOW E&M-EST. PATIENT-LVL IV: ICD-10-PCS | Mod: PBBFAC,,, | Performed by: NURSE PRACTITIONER

## 2019-05-09 PROCEDURE — 99214 OFFICE O/P EST MOD 30 MIN: CPT | Mod: PBBFAC,PO | Performed by: NURSE PRACTITIONER

## 2019-05-09 RX ORDER — LISINOPRIL 2.5 MG/1
2.5 TABLET ORAL DAILY
Qty: 90 TABLET | Refills: 3 | Status: SHIPPED | OUTPATIENT
Start: 2019-05-09 | End: 2021-09-27 | Stop reason: ALTCHOICE

## 2019-05-09 RX ORDER — CYANOCOBALAMIN 1000 UG/ML
1000 INJECTION, SOLUTION INTRAMUSCULAR; SUBCUTANEOUS
COMMUNITY
Start: 2019-03-21 | End: 2021-09-27 | Stop reason: ALTCHOICE

## 2019-05-09 RX ORDER — ATORVASTATIN CALCIUM 10 MG/1
10 TABLET, FILM COATED ORAL DAILY
Qty: 90 TABLET | Refills: 3 | Status: SHIPPED | OUTPATIENT
Start: 2019-05-09 | End: 2021-09-27 | Stop reason: ALTCHOICE

## 2019-05-09 NOTE — TELEPHONE ENCOUNTER
----- Message from Johnnie Salinas DO sent at 5/9/2019  1:51 PM CDT -----  Can do TSH, FT4, T3, CMP, CBC  ----- Message -----  From: Ashley Patel LPN  Sent: 5/9/2019   1:35 PM  To: Johnnie Salinas DO        ----- Message -----  From: DWAIN Porter,ANP-C  Sent: 5/9/2019  12:41 PM  To: Brad Blair Staff (Endo)    Pt of ours--Type 1 and Jeremias's.  Anyway, pt with horrible,horrible fatigue.  States marked cold intolerance, straw like hair, dry skin, -wt gain, though following strict low carb diet, and exercising--everything that is congruent with hypothyroidism.    Primary did a pretty good work up and TFT's WNL w/exception of Free T 3, and only scantly off.        You are seeing her early June.  Do you want to order some other labs before your visit??

## 2019-05-09 NOTE — PROGRESS NOTES
Subjective:       Patient ID: Princess Garza is a 38 y.o. female.    Chief Complaint: routine f/u Type 1 DM/worsening fatigue.     HPI  Pt is a 38 y.o. WF  with a diagnosis of Type 1 diabetes mellitus diagnosed approximately  1985 at age 5 yrs old , as well as chronic conditions pending review including Addisons disease .  Other pertinent medical and social information noted includes, but not limited to: none. Pt does where an Omnipod  Pump and a Dexcom sensor.  3 small children  (6, 3 and 6 mo).  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     Interim Events: Pt does not do formal carb counting.  She eats very low carbs so may only give herself 2 or 3 units plus a correction--She does not use the bolus wizard in pump--nor are the cho or ISF entered in the pump.  Dexcom sensor is downloaded.  Biggest issue is markedly worsened fatigue.  TFT's, Vitamin B-D checked per primary and numbers are grossly WNL.  Goes to bed by 10, sleeps well, up at 5-5:30. Can barely get through day with out nap.  States marked cold intolerance, straw like hair, dry skin, -wt gain, though following strict low carb diet, and exercising--everything that is congruent with hypothyroidism.  DM control has worsened. Insulin pump and sensor are downloaded.  Overall, numbers are fairly smooth, especially considering freq steroids for Addisons'   She is on both florinef and hydrocortisone for Addisons.  The hydro is given based on circadian rhythms--so she is basically getting pulses at different times of the day--which is not helping glucose patterns.     She stopped victoza b/c she didn't think it was benefitting much, but now requests weekly GLP. Very pleasant, high functioning.  Insulin pump and sensor both downloaded and reviewed.        See Media todays date.       Pump Model:Omni pod     Preferred Infusion Sets and Tubing:pods   Frequency of glucose checks a day:sensor BID and prn   Frequency of infusion set changes required:q 3 days    Average amount  of daily insulin used:60     Basal rates:  12 mid----1.1  u/hr   5L30 am-------1.3 u/hr   9 am------------1.6 u/hr   5 pm------------1.25 u/hr   CHO:none   ISF: 0   IOB: 4 hrs     Glucometer required to optimize pump function:Freestyle Regualr     Recommended back up plan in event of insulin pump hiatus: 25 units of basal-- rapid action 2-5 units with meals.         Review of Systems   Constitutional: Positive for fatigue (in bed at 10, no TV, no snoring, up at 5-5:30. some days taking double dose of steroids, ) and unexpected weight change. Negative for activity change.   HENT: Negative for hearing loss and trouble swallowing.    Eyes: Negative for photophobia and visual disturbance.        Last Eye Exam: April 2017   Respiratory: Negative for cough and shortness of breath.    Cardiovascular: Negative for chest pain and palpitations.        Reports low pulse.    Gastrointestinal: Negative for constipation and diarrhea.   Endocrine: Positive for cold intolerance.   Genitourinary: Negative for frequency and urgency.   Musculoskeletal: Negative for arthralgias and myalgias.   Skin: Negative for rash and wound.        Hair loss, dry hair,      Allergic/Immunologic: Negative for food allergies.   Neurological: Negative for weakness and numbness.   Psychiatric/Behavioral: Negative for sleep disturbance. The patient is not nervous/anxious.        Objective:      Physical Exam   Constitutional: She is oriented to person, place, and time. She appears well-developed and well-nourished.   Appears age, well groomed. NAD    HENT:   Head: Normocephalic and atraumatic.   Nose: Nose normal.   Mouth/Throat: Oropharynx is clear and moist.   Eyes: Pupils are equal, round, and reactive to light. Conjunctivae and EOM are normal.   Neck: Normal range of motion. Neck supple. No tracheal deviation present. No thyromegaly present.   Cardiovascular: Normal rate, regular rhythm, normal heart sounds and intact distal pulses.    Pulmonary/Chest: Effort normal and breath sounds normal.   Musculoskeletal: Normal range of motion. She exhibits no deformity.   Feet:    Neurological: She is alert and oriented to person, place, and time.   Skin: Skin is warm and dry.   Psychiatric: She has a normal mood and affect. Her behavior is normal. Judgment and thought content normal.       Hemoglobin A1C   Date Value Ref Range Status   05/07/2019 7.7 (H) 4.0 - 5.6 % Final     Comment:     ADA Screening Guidelines:  5.7-6.4%  Consistent with prediabetes  >or=6.5%  Consistent with diabetes  High levels of fetal hemoglobin interfere with the HbA1C  assay. Heterozygous hemoglobin variants (HbS, HgC, etc)do  not significantly interfere with this assay.   However, presence of multiple variants may affect accuracy.     11/15/2018 7.1 (H) 4.0 - 5.6 % Final     Comment:     ADA Screening Guidelines:  5.7-6.4%  Consistent with prediabetes  >or=6.5%  Consistent with diabetes  High levels of fetal hemoglobin interfere with the HbA1C  assay. Heterozygous hemoglobin variants (HbS, HgC, etc)do  not significantly interfere with this assay.   However, presence of multiple variants may affect accuracy.     09/27/2018 8.0 (H) 0.0 - 5.6 % Final     Comment:     Reference Interval:  5.0 - 5.6 Normal   5.7 - 6.4 High Risk   > 6.5 Diabetic    Hgb A1c results are standardized based on the (NGSP) National   Glycohemoglobin Standardization Program.    Hemoglobin A1C levels are related to mean serum/plasma glucose   during the preceding 2-3 months.            Chemistry        Component Value Date/Time     03/06/2019 1049    K 4.7 03/06/2019 1049    CL 98 03/06/2019 1049    CO2 32 (H) 03/06/2019 1049    BUN 12 03/06/2019 1049    CREATININE 0.67 03/06/2019 1049     (H) 03/06/2019 1049        Component Value Date/Time    CALCIUM 10.4 (H) 03/06/2019 1049    ALKPHOS 80 03/06/2019 1049    AST 17 03/06/2019 1049    ALT 11 03/06/2019 1049    BILITOT 0.8 03/06/2019 1049     ESTGFRAFRICA >60 03/06/2019 1049    EGFRNONAA >60 03/06/2019 1049        Lab Results   Component Value Date    LDLCALC 150.2 05/07/2019     Lab Results   Component Value Date    TSH 0.789 03/06/2019       Assessment:         1. Type 1 diabetes mellitus with complication  Hemoglobin A1c    Lipid panel    Comprehensive metabolic panel  Chronic-worsened-see plan    2. Dyslipidemia  Chronic-add statin    3. Insulin pump status     4. 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 Salinas    6. Adrenal cortical steroids causing adverse effect in therapeutic use  Chronic-complicates DM mgmt    7. Chronic fatigue  New dx--will defer to Dr. salinas          Plan:           Basal rates:  12 mid----1.1  u/hr   530 am-------1.3 u/hr ----increase to 1.6 u/hr  9 am------------1.6 u/hr -----increase to 1.9 u/hr   5 pm------------1.25 u/hr ----increase to 1.5 u/hr     CHO:none   ISF: 0   IOB: 4 hrs        Add 2.5 mg lisinopril, 10 mg atorvastatin per recs  Ozempic--titrate to 1 mg weekly         ORDERS 05/09/2019     3 mo with me with fasting CMP, lipids, a1c     45 min >50% counseling regarding pump changes, thyroid disease,

## 2019-05-13 DIAGNOSIS — T38.0X5A ADRENAL CORTICAL STEROIDS CAUSING ADVERSE EFFECT IN THERAPEUTIC USE: ICD-10-CM

## 2019-05-13 NOTE — TELEPHONE ENCOUNTER
Ro, pts insuracne denied Ozempic due to pt being a type 1 states has to be a type 2. Requested Trulicty or bydureon  Please advise

## 2019-05-13 NOTE — TELEPHONE ENCOUNTER
----- Message from Donna Tracy sent at 5/13/2019  8:38 AM CDT -----  Contact: pt  Pt calling states that she has a medication question that she is wanting to know what to do,because she has to take the medication  Shortly. Rx-hydrocortisone sodium succinate (SOLU-CORTEF) 100 mg SolR,please very important ...[phone]

## 2019-05-14 ENCOUNTER — TELEPHONE (OUTPATIENT)
Dept: ENDOCRINOLOGY | Facility: CLINIC | Age: 39
End: 2019-05-14

## 2019-05-21 ENCOUNTER — TELEPHONE (OUTPATIENT)
Dept: ENDOCRINOLOGY | Facility: CLINIC | Age: 39
End: 2019-05-21

## 2019-05-21 NOTE — TELEPHONE ENCOUNTER
----- Message from Rizwana Yan sent at 5/21/2019  1:54 PM CDT -----  Type:  Pharmacy Calling to Clarify an RX    Name of Caller:  Mily   Pharmacy Name:  [facility]   Prescription Name:  Diabetic testing supply / for electronic glucose moniter   What do they need to clarify?:  Forms incomplete /sent on 05-16 Best Call Back Number:  [phone] fax [phone]   Additional Information:

## 2019-05-21 NOTE — TELEPHONE ENCOUNTER
Spoke with Hiram, advised no orders were received or sent, will refax order form to be completed, verified fax number

## 2019-05-29 ENCOUNTER — LAB VISIT (OUTPATIENT)
Dept: LAB | Facility: HOSPITAL | Age: 39
End: 2019-05-29
Attending: INTERNAL MEDICINE
Payer: OTHER GOVERNMENT

## 2019-05-29 DIAGNOSIS — E83.52 HYPERCALCEMIA: ICD-10-CM

## 2019-05-29 LAB
ALBUMIN SERPL BCP-MCNC: 3.9 G/DL (ref 3.5–5.2)
ALP SERPL-CCNC: 72 U/L (ref 55–135)
ALT SERPL W/O P-5'-P-CCNC: 10 U/L (ref 10–44)
ANION GAP SERPL CALC-SCNC: 7 MMOL/L (ref 8–16)
AST SERPL-CCNC: 12 U/L (ref 10–40)
BILIRUB SERPL-MCNC: 0.9 MG/DL (ref 0.1–1)
BUN SERPL-MCNC: 11 MG/DL (ref 6–20)
CALCIUM SERPL-MCNC: 9.4 MG/DL (ref 8.7–10.5)
CHLORIDE SERPL-SCNC: 103 MMOL/L (ref 95–110)
CO2 SERPL-SCNC: 28 MMOL/L (ref 23–29)
CREAT SERPL-MCNC: 0.8 MG/DL (ref 0.5–1.4)
EST. GFR  (AFRICAN AMERICAN): >60 ML/MIN/1.73 M^2
EST. GFR  (NON AFRICAN AMERICAN): >60 ML/MIN/1.73 M^2
GLUCOSE SERPL-MCNC: 173 MG/DL (ref 70–110)
PHOSPHATE SERPL-MCNC: 2.9 MG/DL (ref 2.7–4.5)
POTASSIUM SERPL-SCNC: 4.2 MMOL/L (ref 3.5–5.1)
PROT SERPL-MCNC: 6.8 G/DL (ref 6–8.4)
PTH-INTACT SERPL-MCNC: 73 PG/ML (ref 9–77)
SODIUM SERPL-SCNC: 138 MMOL/L (ref 136–145)

## 2019-05-29 PROCEDURE — 83970 ASSAY OF PARATHORMONE: CPT

## 2019-05-29 PROCEDURE — 84100 ASSAY OF PHOSPHORUS: CPT

## 2019-05-29 PROCEDURE — 80053 COMPREHEN METABOLIC PANEL: CPT

## 2019-05-29 PROCEDURE — 36415 COLL VENOUS BLD VENIPUNCTURE: CPT | Mod: PO

## 2019-05-30 ENCOUNTER — PATIENT MESSAGE (OUTPATIENT)
Dept: ENDOCRINOLOGY | Facility: CLINIC | Age: 39
End: 2019-05-30

## 2019-05-30 ENCOUNTER — TELEPHONE (OUTPATIENT)
Dept: ENDOCRINOLOGY | Facility: CLINIC | Age: 39
End: 2019-05-30

## 2019-05-30 DIAGNOSIS — R53.82 CHRONIC FATIGUE: Primary | ICD-10-CM

## 2019-05-30 NOTE — TELEPHONE ENCOUNTER
Dr Salinas, pt had recent labs  But is now requested tsh, t3 an t4, states she feels she is having thyroid symptoms  Please advise

## 2019-05-31 ENCOUNTER — LAB VISIT (OUTPATIENT)
Dept: LAB | Facility: HOSPITAL | Age: 39
End: 2019-05-31
Attending: INTERNAL MEDICINE
Payer: OTHER GOVERNMENT

## 2019-05-31 DIAGNOSIS — R53.82 CHRONIC FATIGUE: ICD-10-CM

## 2019-05-31 LAB
T3 SERPL-MCNC: 73 NG/DL (ref 60–180)
T4 FREE SERPL-MCNC: 0.95 NG/DL (ref 0.71–1.51)
TSH SERPL DL<=0.005 MIU/L-ACNC: 0.79 UIU/ML (ref 0.4–4)

## 2019-05-31 PROCEDURE — 84439 ASSAY OF FREE THYROXINE: CPT

## 2019-05-31 PROCEDURE — 36415 COLL VENOUS BLD VENIPUNCTURE: CPT | Mod: PO

## 2019-05-31 PROCEDURE — 84443 ASSAY THYROID STIM HORMONE: CPT

## 2019-05-31 PROCEDURE — 84480 ASSAY TRIIODOTHYRONINE (T3): CPT

## 2019-06-03 ENCOUNTER — OFFICE VISIT (OUTPATIENT)
Dept: ENDOCRINOLOGY | Facility: CLINIC | Age: 39
End: 2019-06-03
Payer: OTHER GOVERNMENT

## 2019-06-03 ENCOUNTER — LAB VISIT (OUTPATIENT)
Dept: LAB | Facility: HOSPITAL | Age: 39
End: 2019-06-03
Attending: INTERNAL MEDICINE
Payer: OTHER GOVERNMENT

## 2019-06-03 VITALS
SYSTOLIC BLOOD PRESSURE: 110 MMHG | BODY MASS INDEX: 26.36 KG/M2 | HEART RATE: 65 BPM | WEIGHT: 164 LBS | DIASTOLIC BLOOD PRESSURE: 70 MMHG | HEIGHT: 66 IN

## 2019-06-03 DIAGNOSIS — R53.83 FATIGUE, UNSPECIFIED TYPE: ICD-10-CM

## 2019-06-03 DIAGNOSIS — T38.0X5A ADRENAL CORTICAL STEROIDS CAUSING ADVERSE EFFECT IN THERAPEUTIC USE: Primary | ICD-10-CM

## 2019-06-03 DIAGNOSIS — E10.9 TYPE 1 DIABETES MELLITUS WITHOUT COMPLICATION: ICD-10-CM

## 2019-06-03 LAB
T4 FREE SERPL-MCNC: 0.8 NG/DL (ref 0.71–1.51)
TSH SERPL DL<=0.005 MIU/L-ACNC: 0.41 UIU/ML (ref 0.4–4)

## 2019-06-03 PROCEDURE — 99999 PR PBB SHADOW E&M-EST. PATIENT-LVL III: CPT | Mod: PBBFAC,,, | Performed by: INTERNAL MEDICINE

## 2019-06-03 PROCEDURE — 99999 PR PBB SHADOW E&M-EST. PATIENT-LVL III: ICD-10-PCS | Mod: PBBFAC,,, | Performed by: INTERNAL MEDICINE

## 2019-06-03 PROCEDURE — 36415 COLL VENOUS BLD VENIPUNCTURE: CPT | Mod: PO

## 2019-06-03 PROCEDURE — 99213 OFFICE O/P EST LOW 20 MIN: CPT | Mod: PBBFAC,PO | Performed by: INTERNAL MEDICINE

## 2019-06-03 PROCEDURE — 84439 ASSAY OF FREE THYROXINE: CPT | Mod: 91

## 2019-06-03 PROCEDURE — 99214 OFFICE O/P EST MOD 30 MIN: CPT | Mod: S$PBB,,, | Performed by: INTERNAL MEDICINE

## 2019-06-03 PROCEDURE — 84443 ASSAY THYROID STIM HORMONE: CPT

## 2019-06-03 PROCEDURE — 99214 PR OFFICE/OUTPT VISIT, EST, LEVL IV, 30-39 MIN: ICD-10-PCS | Mod: S$PBB,,, | Performed by: INTERNAL MEDICINE

## 2019-06-03 PROCEDURE — 84439 ASSAY OF FREE THYROXINE: CPT

## 2019-06-03 NOTE — PROGRESS NOTES
CHIEF COMPLAINT: Addisons  38  year old being seen as a f/u. Has been seeing [facility] for DM 1 management which was diagnosed at age 5. On Omnipod. On hydrocortisone 10-5-5-5-5 on avg. She adjusts also based on how she is feeling. On Florinef 0.1 mg daily. Addisons diagnosed in 2010.  Started on iron and B 12. States that she has been feeling fatigued. States has been making her nausea worse. No biotin. No joint pain. Fatigued throughout the day. Regula menstrual cycle. No supplements. STates gets episodes of bradychardia         PAST MEDICAL HISTORY/PAST SURGICAL HISTORY:  Reviewed in EPIC     SOCIAL HISTORY: No T/A     FAMILY HISTORY:  Father has radiation induced hypothyroidism. Mother with hypothyroidism. No DM     MEDICATIONS/ALLERGIES: The patient's MedCard has been updated and reviewed.      ROS:   Constitutional: No recent significant weight change  Eyes: No recent visual changes  ENT: No dysphagia  Cardiovascular: Denies current anginal symptoms  Respiratory: Denies current respiratory difficulty  Gastrointestinal: Denies recent bowel disturbances  GenitoUrinary - No dysuria  Skin: No new skin rash  Neurologic: No focal neurologic complaints  Remainder ROS negative           PE:    GENERAL: Well developed, well nourished.  NECK: Supple, trachea midline, No palpable thyroid nodules.   CHEST: Resp even and unlabored, CTA bilateral.  CARDIAC: RRR, S1, S2 heard, no murmurs, rubs, S3, or S4      Results for WALTER DIAZ (MRN [id number]) as of 6/3/2019 09:17   Ref. Range 5/29/2019 09:42 5/31/2019 09:27   TSH Latest Ref Range: 0.400 - 4.000 uIU/mL  0.792   T3, Total Latest Ref Range: 60 - 180 ng/dL  73   Free T4 Latest Ref Range: 0.71 - 1.51 ng/dL  0.95   PTH Latest Ref Range: 9.0 - 77.0 pg/mL 73.0      Results for WALTER DIAZ (MRN [id number]) as of 6/3/2019 09:17   Ref. Range 9/15/2018 07:43   Renin Activity Latest Units: ng/mL/h 22       Results for WALTER DAIZ (MRN [id number]) as of 6/3/2019 09:17    Ref. Range 5/29/2019 09:42   Sodium Latest Ref Range: 136 - 145 mmol/L 138   Potassium Latest Ref Range: 3.5 - 5.1 mmol/L 4.2   Chloride Latest Ref Range: 95 - 110 mmol/L 103   CO2 Latest Ref Range: 23 - 29 mmol/L 28   Anion Gap Latest Ref Range: 8 - 16 mmol/L 7 (L)   BUN, Bld Latest Ref Range: 6 - 20 mg/dL 11   Creatinine Latest Ref Range: 0.5 - 1.4 mg/dL 0.8   eGFR if non African American Latest Ref Range: >60 mL/min/1.73 m^2 >60.0   eGFR if African American Latest Ref Range: >60 mL/min/1.73 m^2 >60.0   Glucose Latest Ref Range: 70 - 110 mg/dL 173 (H)   Calcium Latest Ref Range: 8.7 - 10.5 mg/dL 9.4   Phosphorus Latest Ref Range: 2.7 - 4.5 mg/dL 2.9   Alkaline Phosphatase Latest Ref Range: 55 - 135 U/L 72   PROTEIN TOTAL Latest Ref Range: 6.0 - 8.4 g/dL 6.8   Albumin Latest Ref Range: 3.5 - 5.2 g/dL 3.9   BILIRUBIN TOTAL Latest Ref Range: 0.1 - 1.0 mg/dL 0.9   AST Latest Ref Range: 10 - 40 U/L 12   ALT Latest Ref Range: 10 - 44 U/L 10     Results for WALTER DIAZ (MRN [id number]) as of 6/3/2019 09:21   Ref. Range 3/6/2019 10:49   Thyroperoxidase Antibodies Latest Ref Range: 0.0 - 9.0 IU/mL 1.6          ASSESSMENT/PLAN:  1. Adrenal Insufficiency- discussed that she is taking quite frequent doses of steroids. States still having fatigue even with increased doses of steroids. Refilled hydrocortisone vials for sick day use.      2. DM 1- following with Ro. Discussed r/o other autoimmune diseases    3. Fatigue- very concerned that her fatigue and hair loss are thyroid related. Discussed that has had negative Ab in the past. Also discussed that labs do not reflect primary or secondary hypothyroidism. Discussed could get 2nd opinion as she is very concerned since er Addisons was missed. Offered to repeat labs and do a Ft4 via dialysis method. On B 12 and iron. Has had a w/u for celiac disease. Patient also asked about taking thyroid medication. If symptomatic. Discussed not currently recommended to take thyroid  medication to treat symptoms if no lab abnormality. ALso discussed considering cardiac w/u if TFT are normal.         FOLLOWUP  TSH, Ft4, Ft4- dialyisis

## 2019-06-05 ENCOUNTER — TELEPHONE (OUTPATIENT)
Dept: ENDOCRINOLOGY | Facility: CLINIC | Age: 39
End: 2019-06-05

## 2019-06-05 NOTE — TELEPHONE ENCOUNTER
Spoke to pt and adv ins will not cover ozempic due to she is type 1 diabetic, voiced understanding. Pt stated she may pay out of pocket until she can change ins.

## 2019-06-10 ENCOUNTER — PATIENT MESSAGE (OUTPATIENT)
Dept: ENDOCRINOLOGY | Facility: CLINIC | Age: 39
End: 2019-06-10

## 2019-06-10 LAB — T4 FREE SERPL DIALY-MCNC: 1.5 NG/DL (ref 0.8–2)

## 2019-06-11 ENCOUNTER — TELEPHONE (OUTPATIENT)
Dept: ENDOCRINOLOGY | Facility: CLINIC | Age: 39
End: 2019-06-11

## 2019-06-11 ENCOUNTER — PATIENT MESSAGE (OUTPATIENT)
Dept: ENDOCRINOLOGY | Facility: CLINIC | Age: 39
End: 2019-06-11

## 2019-06-11 NOTE — TELEPHONE ENCOUNTER
----- Message from Ben Guo sent at 6/11/2019 11:38 AM CDT -----  Contact: Patient  Type: Needs Medical Advice    Who Called:  Patient  Pharmacy name and phone #:    [facility] [address] [place], [place] - [address]  [address] [address]  Phone: [phone] Fax: [phone]  Best Call Back Number: [phone]  Additional Information: Patient is calling to notify office the prior authorization is required for medication Ozempic. Please advise patient

## 2019-06-11 NOTE — TELEPHONE ENCOUNTER
Spoke to pt and adv PA attempted for ozempic through pt's new ins and drug is not covered, voiced understanding.

## 2019-06-11 NOTE — TELEPHONE ENCOUNTER
----- Message from Irwin Desai sent at 6/11/2019 11:17 AM CDT -----  Contact: Patient  [phone]  Type: Needs Medical Advice    Who Called:  Patient  [phone]    Additional Information:     Advised returning a call to [facility] regarding new insurance and her medications. Please call.

## 2019-06-11 NOTE — TELEPHONE ENCOUNTER
Left message adv pt PA was denied and nothing else we can do to get med covered. Ins will not cover it.

## 2019-08-07 PROBLEM — R53.83 FATIGUE: Status: ACTIVE | Noted: 2019-08-07

## 2019-08-22 ENCOUNTER — OFFICE VISIT (OUTPATIENT)
Dept: ENDOCRINOLOGY | Facility: CLINIC | Age: 39
End: 2019-08-22
Payer: COMMERCIAL

## 2019-08-22 VITALS
HEART RATE: 58 BPM | HEIGHT: 66 IN | SYSTOLIC BLOOD PRESSURE: 122 MMHG | WEIGHT: 176.56 LBS | DIASTOLIC BLOOD PRESSURE: 76 MMHG | BODY MASS INDEX: 28.38 KG/M2

## 2019-08-22 DIAGNOSIS — Z96.41 INSULIN PUMP STATUS: ICD-10-CM

## 2019-08-22 DIAGNOSIS — E10.8 TYPE 1 DIABETES MELLITUS WITH COMPLICATION: Primary | ICD-10-CM

## 2019-08-22 DIAGNOSIS — E27.1 ADDISON'S DISEASE: ICD-10-CM

## 2019-08-22 DIAGNOSIS — R53.82 CHRONIC FATIGUE: ICD-10-CM

## 2019-08-22 DIAGNOSIS — Z46.81 INSULIN PUMP FITTING OR ADJUSTMENT: ICD-10-CM

## 2019-08-22 PROCEDURE — 99215 OFFICE O/P EST HI 40 MIN: CPT | Mod: S$GLB,,, | Performed by: NURSE PRACTITIONER

## 2019-08-22 PROCEDURE — 99999 PR PBB SHADOW E&M-EST. PATIENT-LVL III: ICD-10-PCS | Mod: PBBFAC,,, | Performed by: NURSE PRACTITIONER

## 2019-08-22 PROCEDURE — 99215 PR OFFICE/OUTPT VISIT, EST, LEVL V, 40-54 MIN: ICD-10-PCS | Mod: S$GLB,,, | Performed by: NURSE PRACTITIONER

## 2019-08-22 PROCEDURE — 99999 PR PBB SHADOW E&M-EST. PATIENT-LVL III: CPT | Mod: PBBFAC,,, | Performed by: NURSE PRACTITIONER

## 2019-08-22 RX ORDER — METFORMIN HYDROCHLORIDE 500 MG/1
500 TABLET ORAL 2 TIMES DAILY WITH MEALS
Qty: 60 TABLET | Refills: 11 | Status: SHIPPED | OUTPATIENT
Start: 2019-08-22 | End: 2021-09-27 | Stop reason: ALTCHOICE

## 2019-08-22 NOTE — PROGRESS NOTES
Subjective:       Patient ID: Princess Garza is a 38 y.o. female.    Chief Complaint: routine f/u Type 1 DM/worsening fatigue.       Pt is a 38 y.o. WF  with a diagnosis of Type 1 diabetes mellitus diagnosed approximately  1985 at age 5 yrs old , as well as chronic conditions pending review including Addisons disease .  Other pertinent medical and social information noted includes, but not limited to: none. Pt does where an Omnipod  Pump and a Dexcom sensor.  3 small children  (6, 3 and 6 mo).  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     Interim Events: Pt does not do formal carb counting.  She eats very low carbs so may only give herself 2 or 3 units plus a correction--She does not use the bolus wizard in pump--nor are the cho or ISF entered in the pump.  Dexcom sensor is downloaded.  Biggest issue is markedly worsened fatigue.  TFT's, Vitamin B-D checked per primary and numbers are grossly WNL.  Goes to bed by 10, sleeps well, up at 5-5:30. Can barely get through day with out nap.  States marked cold intolerance, straw like hair, dry skin, -wt gain, though following strict low carb diet, and exercising--everything that is congruent with hypothyroidism.  This continues.  Feels it is thyroid though levels are low WNL.  Insulin pump and sensor are downloaded.  Dm control has worsened significantly last several months.    She is on both florinef and hydrocortisone for Addisons.  The hydro is given based on circadian rhythms--so she is basically getting pulses at different times of the day--which is not helping glucose patterns.     She stopped victoza b/c she didn't think it was benefitting much, but now requests weekly GLP. Very pleasant, high functioning.  Insulin pump and sensor both downloaded and reviewed.        See Media todays date.       Pump Model:Omni pod     Preferred Infusion Sets and Tubing:pods   Frequency of glucose checks a day:sensor BID and prn   Frequency of infusion set changes required:q 3 days     Average amount of daily insulin used:60     Basal rates:  12 mid----1.  u/hr   5L30 am-------1.4 u/hr   9 am------------1.7 u/hr   5 pm------------1.35 u/hr   CHO:none   ISF: 0   IOB: 4 hrs     Glucometer required to optimize pump function:Freestyle Regualr     Recommended back up plan in event of insulin pump hiatus: 25 units of basal-- rapid action 2-5 units with meals.         Review of Systems   Constitutional: Positive for fatigue (in bed at 10, no TV, no snoring, up at 5-5:30. some days taking double dose of steroids, ) and unexpected weight change. Negative for activity change.   HENT: Negative for hearing loss and trouble swallowing.    Eyes: Negative for photophobia and visual disturbance.        Last Eye Exam: April 2017   Respiratory: Negative for cough and shortness of breath.    Cardiovascular: Negative for chest pain and palpitations.        Reports low pulse.    Gastrointestinal: Negative for constipation and diarrhea.   Endocrine: Positive for cold intolerance.   Genitourinary: Negative for frequency and urgency.   Musculoskeletal: Negative for arthralgias and myalgias.   Skin: Negative for rash and wound.        Hair loss, dry hair,      Allergic/Immunologic: Negative for food allergies.   Neurological: Negative for weakness and numbness.   Psychiatric/Behavioral: Negative for sleep disturbance. The patient is not nervous/anxious.        Objective:      Physical Exam   Constitutional: She is oriented to person, place, and time. She appears well-developed and well-nourished.   Appears age, well groomed. NAD    HENT:   Head: Normocephalic and atraumatic.   Nose: Nose normal.   Mouth/Throat: Oropharynx is clear and moist.   Eyes: Pupils are equal, round, and reactive to light. Conjunctivae and EOM are normal.   Neck: Normal range of motion. Neck supple. No tracheal deviation present. No thyromegaly present.   Cardiovascular: Normal rate, regular rhythm, normal heart sounds and intact distal pulses.    Pulmonary/Chest: Effort normal and breath sounds normal.   Musculoskeletal: Normal range of motion. She exhibits no deformity.   Feet:    Neurological: She is alert and oriented to person, place, and time.   Skin: Skin is warm and dry.   Psychiatric: She has a normal mood and affect. Her behavior is normal. Judgment and thought content normal.       Hemoglobin A1C   Date Value Ref Range Status   05/07/2019 7.7 (H) 4.0 - 5.6 % Final     Comment:     ADA Screening Guidelines:  5.7-6.4%  Consistent with prediabetes  >or=6.5%  Consistent with diabetes  High levels of fetal hemoglobin interfere with the HbA1C  assay. Heterozygous hemoglobin variants (HbS, HgC, etc)do  not significantly interfere with this assay.   However, presence of multiple variants may affect accuracy.     11/15/2018 7.1 (H) 4.0 - 5.6 % Final     Comment:     ADA Screening Guidelines:  5.7-6.4%  Consistent with prediabetes  >or=6.5%  Consistent with diabetes  High levels of fetal hemoglobin interfere with the HbA1C  assay. Heterozygous hemoglobin variants (HbS, HgC, etc)do  not significantly interfere with this assay.   However, presence of multiple variants may affect accuracy.     09/27/2018 8.0 (H) 0.0 - 5.6 % Final     Comment:     Reference Interval:  5.0 - 5.6 Normal   5.7 - 6.4 High Risk   > 6.5 Diabetic    Hgb A1c results are standardized based on the (NGSP) National   Glycohemoglobin Standardization Program.    Hemoglobin A1C levels are related to mean serum/plasma glucose   during the preceding 2-3 months.            Chemistry        Component Value Date/Time     05/29/2019 0942    K 4.2 05/29/2019 0942     05/29/2019 0942    CO2 28 05/29/2019 0942    BUN 11 05/29/2019 0942    CREATININE 0.8 05/29/2019 0942     (H) 05/29/2019 0942        Component Value Date/Time    CALCIUM 9.4 05/29/2019 0942    ALKPHOS 72 05/29/2019 0942    AST 12 05/29/2019 0942    ALT 10 05/29/2019 0942    BILITOT 0.9 05/29/2019 0942    ESTGFRAFRICA  >60.0 05/29/2019 0942    EGFRNONAA >60.0 05/29/2019 0942        Lab Results   Component Value Date    LDLCALC 150.2 05/07/2019     Lab Results   Component Value Date    TSH 0.409 06/03/2019       Assessment:         1. Type 1 diabetes mellitus with complication  Hemoglobin A1c    Lipid panel    Comprehensive metabolic panel  Chronic-worsened-see plan    2. Dyslipidemia  Chronic-add statin    3. Insulin pump status     4. Insulin pump fitting or adjustment     5. [place]'s disease  Chronic-per Dr. Salinas    6. Adrenal cortical steroids causing adverse effect in therapeutic use  Chronic-complicates DM mgmt    7. Chronic fatigue  New dx--will defer to Dr. salinas          Plan:          Change settings as follows:   12 mid----1.  u/hr ----increase to 1.2 u.hr   5L30 am-------1.4 u/hr ----increase to 1.7 u/hr   9 am------------1.7 u/hr ---increase to 2 u/hr   5 pm------------1.35 u/hr ---increase to 1.6     CHO:n1:5  ISF: 120  IOB: 3 hrs       ORDERS 08/22/2019     6 weeks with   fasting CMP, lipids, a1c     45 min >50% counseling regarding pump changes, thyroid disease,

## 2019-10-10 PROBLEM — E27.1 ADRENAL INSUFFICIENCY (ADDISON'S DISEASE): Status: ACTIVE | Noted: 2019-10-10

## 2020-03-15 ENCOUNTER — PATIENT MESSAGE (OUTPATIENT)
Dept: ENDOCRINOLOGY | Facility: CLINIC | Age: 40
End: 2020-03-15

## 2021-05-12 ENCOUNTER — PATIENT MESSAGE (OUTPATIENT)
Dept: RESEARCH | Facility: HOSPITAL | Age: 41
End: 2021-05-12

## 2021-09-27 ENCOUNTER — OFFICE VISIT (OUTPATIENT)
Dept: OBSTETRICS AND GYNECOLOGY | Facility: CLINIC | Age: 41
End: 2021-09-27
Payer: COMMERCIAL

## 2021-09-27 VITALS
HEIGHT: 66 IN | WEIGHT: 153.69 LBS | DIASTOLIC BLOOD PRESSURE: 66 MMHG | BODY MASS INDEX: 24.7 KG/M2 | SYSTOLIC BLOOD PRESSURE: 102 MMHG

## 2021-09-27 DIAGNOSIS — Z01.419 GYNECOLOGIC EXAM NORMAL: Primary | ICD-10-CM

## 2021-09-27 DIAGNOSIS — N92.0 MENORRHAGIA WITH REGULAR CYCLE: ICD-10-CM

## 2021-09-27 PROCEDURE — 87624 HPV HI-RISK TYP POOLED RSLT: CPT | Performed by: OBSTETRICS & GYNECOLOGY

## 2021-09-27 PROCEDURE — 3074F PR MOST RECENT SYSTOLIC BLOOD PRESSURE < 130 MM HG: ICD-10-PCS | Mod: CPTII,S$GLB,, | Performed by: OBSTETRICS & GYNECOLOGY

## 2021-09-27 PROCEDURE — 99999 PR PBB SHADOW E&M-EST. PATIENT-LVL III: ICD-10-PCS | Mod: PBBFAC,,, | Performed by: OBSTETRICS & GYNECOLOGY

## 2021-09-27 PROCEDURE — 3008F PR BODY MASS INDEX (BMI) DOCUMENTED: ICD-10-PCS | Mod: CPTII,S$GLB,, | Performed by: OBSTETRICS & GYNECOLOGY

## 2021-09-27 PROCEDURE — 3008F BODY MASS INDEX DOCD: CPT | Mod: CPTII,S$GLB,, | Performed by: OBSTETRICS & GYNECOLOGY

## 2021-09-27 PROCEDURE — 3078F PR MOST RECENT DIASTOLIC BLOOD PRESSURE < 80 MM HG: ICD-10-PCS | Mod: CPTII,S$GLB,, | Performed by: OBSTETRICS & GYNECOLOGY

## 2021-09-27 PROCEDURE — 99385 PREV VISIT NEW AGE 18-39: CPT | Mod: S$GLB,,, | Performed by: OBSTETRICS & GYNECOLOGY

## 2021-09-27 PROCEDURE — 1159F MED LIST DOCD IN RCRD: CPT | Mod: CPTII,S$GLB,, | Performed by: OBSTETRICS & GYNECOLOGY

## 2021-09-27 PROCEDURE — 3078F DIAST BP <80 MM HG: CPT | Mod: CPTII,S$GLB,, | Performed by: OBSTETRICS & GYNECOLOGY

## 2021-09-27 PROCEDURE — 3074F SYST BP LT 130 MM HG: CPT | Mod: CPTII,S$GLB,, | Performed by: OBSTETRICS & GYNECOLOGY

## 2021-09-27 PROCEDURE — 1159F PR MEDICATION LIST DOCUMENTED IN MEDICAL RECORD: ICD-10-PCS | Mod: CPTII,S$GLB,, | Performed by: OBSTETRICS & GYNECOLOGY

## 2021-09-27 PROCEDURE — 99385 PR PREVENTIVE VISIT,NEW,18-39: ICD-10-PCS | Mod: S$GLB,,, | Performed by: OBSTETRICS & GYNECOLOGY

## 2021-09-27 PROCEDURE — 88175 CYTOPATH C/V AUTO FLUID REDO: CPT | Performed by: OBSTETRICS & GYNECOLOGY

## 2021-09-27 PROCEDURE — 99999 PR PBB SHADOW E&M-EST. PATIENT-LVL III: CPT | Mod: PBBFAC,,, | Performed by: OBSTETRICS & GYNECOLOGY

## 2021-09-27 RX ORDER — LIOTHYRONINE SODIUM 25 UG/1
12 TABLET ORAL 3 TIMES DAILY
COMMUNITY

## 2021-10-04 LAB
FINAL PATHOLOGIC DIAGNOSIS: NORMAL
HPV HR 12 DNA SPEC QL NAA+PROBE: NEGATIVE
HPV16 AG SPEC QL: NEGATIVE
HPV18 DNA SPEC QL NAA+PROBE: NEGATIVE
Lab: NORMAL

## 2023-04-03 NOTE — TELEPHONE ENCOUNTER
I have personally seen and examined this patient.    I have reviewed all pertinent clinical information and reviewed all relevant imaging and diagnostic studies personally.   I counseled the patient about diagnostic testing and treatment plan. All questions were answered.   I discussed recommendations with the primary team. PA [id number] for ozempic was denied by ins.

## 2023-06-27 ENCOUNTER — OFFICE VISIT (OUTPATIENT)
Dept: OBSTETRICS AND GYNECOLOGY | Facility: CLINIC | Age: 43
End: 2023-06-27
Payer: COMMERCIAL

## 2023-06-27 ENCOUNTER — HOSPITAL ENCOUNTER (OUTPATIENT)
Dept: RADIOLOGY | Facility: HOSPITAL | Age: 43
Discharge: HOME | End: 2023-06-27
Attending: OBSTETRICS & GYNECOLOGY
Payer: COMMERCIAL

## 2023-06-27 VITALS
WEIGHT: 155.63 LBS | HEIGHT: 66 IN | BODY MASS INDEX: 25.01 KG/M2 | DIASTOLIC BLOOD PRESSURE: 76 MMHG | SYSTOLIC BLOOD PRESSURE: 106 MMHG

## 2023-06-27 DIAGNOSIS — Z12.31 VISIT FOR SCREENING MAMMOGRAM: ICD-10-CM

## 2023-06-27 DIAGNOSIS — Z01.419 GYNECOLOGIC EXAM NORMAL: Primary | ICD-10-CM

## 2023-06-27 PROCEDURE — 99999 PR PBB SHADOW E&M-EST. PATIENT-LVL IV: ICD-10-PCS | Mod: PBBFAC,,, | Performed by: OBSTETRICS & GYNECOLOGY

## 2023-06-27 PROCEDURE — 3008F BODY MASS INDEX DOCD: CPT | Mod: CPTII,S$GLB,, | Performed by: OBSTETRICS & GYNECOLOGY

## 2023-06-27 PROCEDURE — 3078F PR MOST RECENT DIASTOLIC BLOOD PRESSURE < 80 MM HG: ICD-10-PCS | Mod: CPTII,S$GLB,, | Performed by: OBSTETRICS & GYNECOLOGY

## 2023-06-27 PROCEDURE — 77067 MAMMO DIGITAL SCREENING BILAT WITH TOMO: ICD-10-PCS | Mod: 26,,, | Performed by: RADIOLOGY

## 2023-06-27 PROCEDURE — 1159F PR MEDICATION LIST DOCUMENTED IN MEDICAL RECORD: ICD-10-PCS | Mod: CPTII,S$GLB,, | Performed by: OBSTETRICS & GYNECOLOGY

## 2023-06-27 PROCEDURE — 1159F MED LIST DOCD IN RCRD: CPT | Mod: CPTII,S$GLB,, | Performed by: OBSTETRICS & GYNECOLOGY

## 2023-06-27 PROCEDURE — 99396 PREV VISIT EST AGE 40-64: CPT | Mod: S$GLB,,, | Performed by: OBSTETRICS & GYNECOLOGY

## 2023-06-27 PROCEDURE — 77063 MAMMO DIGITAL SCREENING BILAT WITH TOMO: ICD-10-PCS | Mod: 26,,, | Performed by: RADIOLOGY

## 2023-06-27 PROCEDURE — 88175 CYTOPATH C/V AUTO FLUID REDO: CPT | Performed by: OBSTETRICS & GYNECOLOGY

## 2023-06-27 PROCEDURE — 3008F PR BODY MASS INDEX (BMI) DOCUMENTED: ICD-10-PCS | Mod: CPTII,S$GLB,, | Performed by: OBSTETRICS & GYNECOLOGY

## 2023-06-27 PROCEDURE — 3078F DIAST BP <80 MM HG: CPT | Mod: CPTII,S$GLB,, | Performed by: OBSTETRICS & GYNECOLOGY

## 2023-06-27 PROCEDURE — 77067 SCR MAMMO BI INCL CAD: CPT | Mod: 26,,, | Performed by: RADIOLOGY

## 2023-06-27 PROCEDURE — 77067 SCR MAMMO BI INCL CAD: CPT | Mod: TC,PN

## 2023-06-27 PROCEDURE — 3074F SYST BP LT 130 MM HG: CPT | Mod: CPTII,S$GLB,, | Performed by: OBSTETRICS & GYNECOLOGY

## 2023-06-27 PROCEDURE — 99396 PR PREVENTIVE VISIT,EST,40-64: ICD-10-PCS | Mod: S$GLB,,, | Performed by: OBSTETRICS & GYNECOLOGY

## 2023-06-27 PROCEDURE — 3074F PR MOST RECENT SYSTOLIC BLOOD PRESSURE < 130 MM HG: ICD-10-PCS | Mod: CPTII,S$GLB,, | Performed by: OBSTETRICS & GYNECOLOGY

## 2023-06-27 PROCEDURE — 99999 PR PBB SHADOW E&M-EST. PATIENT-LVL IV: CPT | Mod: PBBFAC,,, | Performed by: OBSTETRICS & GYNECOLOGY

## 2023-06-27 PROCEDURE — 77063 BREAST TOMOSYNTHESIS BI: CPT | Mod: 26,,, | Performed by: RADIOLOGY

## 2023-06-27 RX ORDER — ACETAMINOPHEN 500 MG
5000 TABLET ORAL
COMMUNITY

## 2023-06-27 RX ORDER — HYDROCORTISONE 5 MG/1
TABLET ORAL
COMMUNITY

## 2023-06-27 RX ORDER — LIOTHYRONINE SODIUM 5 UG/1
TABLET ORAL
COMMUNITY

## 2023-06-27 NOTE — PROGRESS NOTES
"Chief Complaint   Patient presents with    Well Woman    Mammogram       History of Present Illness: Princess Garza is a 42 y.o. female that presents today 2023 with Patient's last menstrual period was 2023.  for well gyn visit.    Past Medical History:   Diagnosis Date    Abnormal Pap smear of cervix     years ago- repeat pap    Jeremias's disease     Diabetes mellitus type I 1985    age 5 years.    Palpitations     Transfusion history        Past Surgical History:   Procedure Laterality Date    ABDOMINOPLASTY N/A 10/10/2018    Procedure: ABDOMINOPLASTY - Repair of Diastasis with Mesh;  Surgeon: Peña Martin MD;  Location: [facility];  Service: Plastics;  Laterality: N/A;     SECTION      x3    HERNIA REPAIR N/A 10/10/2018    Procedure: REPAIR, HERNIA - Umbilical WITH MESH;  Surgeon: Peña Martin MD;  Location: [facility] OR;  Service: Plastics;  Laterality: N/A;    TUBAL LIGATION         Current Outpatient Medications   Medication Sig Dispense Refill    BD LUER-LATRICE SYRINGE 3 mL 22 x 1 1/2" Syrg       blood sugar diagnostic Strp Freestyle Lite--pt checks glucose  strip prn    cholecalciferol, vitamin D3, 125 mcg (5,000 unit) Tab Take 5,000 Units by mouth.      fludrocortisone (FLORINEF) 0.1 mg Tab One a day--except may increase to 2 a day with illness--stress. 120 tablet 3    glucagon 1 mg SolR Glucagon (HCl) Emergency Kit 1 mg solution for injection   Take by injection route.      hydrocortisone (CORTEF) 5 MG Tab hydrocortisone 5 mg tablet   Take 40mg in split doses + extra 10mg on sick days      hydrocortisone sodium succinate (SOLU-CORTEF) 100 mg SolR Hydrocortisone Vial 100 mg. Use PRN adrenal crisis up to Q 8 hours. Supply 3 cc syringe with 22 G 1 inch needle 3 each 3    insulin glargine (LANTUS SOLOSTAR U-100 INSULIN) 100 unit/mL (3 mL) InPn pen 25 units at bedtime in event insulin pump is discontinued. 1 Box 0    liothyronine (CYTOMEL) 25 MCG Tab Take 12 mcg by " "mouth 3 (three) times daily.      liothyronine (CYTOMEL) 5 MCG Tab Cytomel 5 mcg tablet   Take 4 tablets with lunch and 2 tablets before bed      NOVOLOG U-100 INSULIN ASPART 100 unit/mL injection Pt uses 60 units a day via insulin. 6 vial 3    pen needle, diabetic 31 gauge x 5/16" Ndle To use with insulin pens 50 each 0    SURE COMFORT PEN NEEDLE 32 gauge x 5/32" Ndle        No current facility-administered medications for this visit.     Facility-Administered Medications Ordered in Other Visits   Medication Dose Route Frequency Provider Last Rate Last Admin    ceFAZolin injection 2 g  2 g Intravenous On Call Procedure Peña Martin MD   2 g at 10/10/18 0710    diphenhydrAMINE injection 25 mg  25 mg Intravenous Q6H PRN Luis Bridges MD        hydrocortisone sodium succinate injection 100 mg  100 mg Intravenous On Call Procedure Peña Martin MD        lorazepam injection 0.5 mg  0.5 mg Intravenous Q5 Min PRN Luis Bridges MD           Review of patient's allergies indicates:  No Known Allergies    Family History   Problem Relation Age of Onset    Hypothyroidism Mother     Thyroid disease Mother     Hypertension Mother     Tongue cancer Father     Cancer Father     Hypertension Father     No Known Problems Sister     Breast cancer Neg Hx     Ovarian cancer Neg Hx        Social History     Socioeconomic History    Marital status:    Tobacco Use    Smoking status: Never    Smokeless tobacco: Never   Substance and Sexual Activity    Alcohol use: No    Drug use: No    Sexual activity: Yes     Partners: Male   Social History Narrative    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         OB History    Para Term  AB Living   5 3 3   2     SAB IAB Ectopic Multiple Live Births   2              # Outcome Date GA Lbr Cirilo/2nd Weight Sex Delivery Anes PTL Lv   5 SAB            4 SAB            3 Term     F CS-LTranv      2 Term     F CS-LTranv      1 Term " "    M CS-LTranv         Obstetric Comments   Breech, 10 lbs, and then 12 lb bab   Postpartum hemorrhage, transfusion        Review of Symptoms:  GENERAL: Denies weight gain or weight loss. Feeling well overall.   SKIN: Denies rash or lesions.   HEAD: Denies head injury or headache.   NODES: Denies enlarged lymph nodes.   CHEST: Denies chest pain or shortness of breath.   CARDIOVASCULAR: Denies palpitations or left sided chest pain.   ABDOMEN: No abdominal pain, constipation, diarrhea, nausea, vomiting or rectal bleeding.   URINARY: No frequency, dysuria, hematuria, or burning on urination.  HEMATOLOGIC: No easy bruisability or excessive bleeding.   MUSCULOSKELETAL: Denies joint pain or swelling.     /76   Ht 5' 6" (1.676 m)   Wt 70.6 kg (155 lb 10.3 oz)   LMP 06/18/2023   Physical Exam:  APPEARANCE: Well nourished, well developed, in no acute distress.  SKIN: Normal skin turgor, no lesions.  NECK: Neck symmetric without masses   RESPIRATORY: Normal respiratory effort with no retractions or use of accessory muscles  CARDIOVASCULAR: Peripheral vascular system with no swelling no varicosities and palpation of pulses normal  LYMPHATIC: No enlargements of the lymph nodes noted in the neck, axillae, or groin  ABDOMEN: Soft. No tenderness or masses. No hepatosplenomegaly. No hernias.  BREASTS: Symmetrical, no skin changes or visible lesions. No palpable masses, nipple discharge or adenopathy bilaterally.  PELVIC: Normal external female genitalia without lesions. Normal hair distribution. Adequate perineal body, normal urethral meatus. Urethra with no masses.  Bladder nontender. Vagina moist and well rugated without lesions or discharge. Cervix pink and without lesions. No significant cystocele or rectocele. Bimanual exam showed uterus normal size, shape, position, mobile and nontender. Adnexa without masses or tenderness. Urethra and bladder normal.   EXTREMITIES: No clubbing cyanosis or " edema.    ASSESSMENT/PLAN:  Gynecologic exam normal  -     Liquid-Based Pap Smear, Screening    Visit for screening mammogram  -     Mammo Digital Screening Bilat w/ José Luis; Future; Expected date: 06/27/2023          Patient was counseled today on Pelvic exams and Pap Smear guidelines.   We discussed STD screening if at high risk for a STD.  We discussed recommendation for breast cancer screening with mammogram every other year after the age of 40 and annually after the age of 50.    We discussed colon cancer screening when indicated.   Osteoporosis screening discussed when indicated.   She was advised to see her primary care physician for all other health maintenance.     FOLLOW-UP with me for next routine visit.

## 2023-07-03 LAB
FINAL PATHOLOGIC DIAGNOSIS: NORMAL
Lab: NORMAL

## 2024-07-22 ENCOUNTER — TELEPHONE (OUTPATIENT)
Dept: NEPHROLOGY | Facility: CLINIC | Age: 44
End: 2024-07-22
Payer: COMMERCIAL

## 2024-07-22 NOTE — TELEPHONE ENCOUNTER
----- Message from Mini Baeza sent at 7/22/2024 11:08 AM CDT -----  Contact: self  Type:  Sooner Appointment Request    Caller is requesting a sooner appointment.  Caller declined first available appointment listed below.  Caller will not accept being placed on the waitlist and is requesting a message be sent to doctor.    Name of Caller:  pt  When is the first available appointment?  N/a ( I checked all the way through the end of the year also verified with pt that dr omer is a kidney dr)  Symptoms:  type 1 diabetic would like to establish care  Would the patient rather a call back or a response via [facility]? call  Best Call Back Number:  [phone]   Additional Information:  please call

## 2024-09-13 ENCOUNTER — TELEPHONE (OUTPATIENT)
Dept: NEPHROLOGY | Facility: CLINIC | Age: 44
End: 2024-09-13
Payer: COMMERCIAL

## 2024-09-13 NOTE — TELEPHONE ENCOUNTER
Patient needs to reschedule her NP appointment for next Friday. She has no scheduling requirements but can not come in next Monday.I will send this message to Catarina for scheduling.I have not cancele that appointment for next Friday.

## 2024-09-13 NOTE — TELEPHONE ENCOUNTER
----- Message from Vee Cortes sent at 9/13/2024 11:53 AM CDT -----  Contact: self  Type: Needs Medical Advice  Who Called:  pt   Symptoms (please be specific):  was asked to call back after she saw her PCP  Best Call Back Number: [phone]  Additional Information: She spoke to someone yesterday and was asked to call back today after she saw her PCP to see if she needs to be seen sooner.  I couldn't see who she spoke to because there was nothing logged.  thanks

## 2024-10-03 ENCOUNTER — OFFICE VISIT (OUTPATIENT)
Dept: NEPHROLOGY | Facility: CLINIC | Age: 44
End: 2024-10-03
Payer: COMMERCIAL

## 2024-10-03 VITALS — SYSTOLIC BLOOD PRESSURE: 100 MMHG | HEART RATE: 79 BPM | OXYGEN SATURATION: 99 % | DIASTOLIC BLOOD PRESSURE: 78 MMHG

## 2024-10-03 DIAGNOSIS — E27.1 ADDISON'S DISEASE: ICD-10-CM

## 2024-10-03 DIAGNOSIS — E10.8 TYPE 1 DIABETES MELLITUS WITH COMPLICATION: Primary | ICD-10-CM

## 2024-10-03 PROCEDURE — 99999 PR PBB SHADOW E&M-EST. PATIENT-LVL III: CPT | Mod: PBBFAC,,, | Performed by: INTERNAL MEDICINE

## 2024-10-03 NOTE — PROGRESS NOTES
Subjective:       Patient ID: Princess Garza is a 43 y.o. White female who presents for new patient evaluation for chronic renal failure.    Princess Garza is referred by Gamaliel Tran MD to be evaluated for chronic renal failure.      Patient has had type 1 diabetes since she was 5 yo. Currently being managed with an insulin pump and followed by Dr. Lam. Denies retinopathy or neuropathy. 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 She has had three pregnancies, never had preeclampsia. Denies any previous issues with CKD or proteiuria.     in , lived in [place] for last two years.  Three kids, 7-12yo at [facility].  Maternal GM with [employer]  Used to work as RN at [employer]    Review of Systems   Constitutional:  Negative for chills and fever.   HENT:  Negative for congestion.    Respiratory:  Negative for cough and shortness of breath.    Cardiovascular:  Negative for chest pain and leg swelling.   Gastrointestinal:  Negative for abdominal pain, diarrhea, nausea and vomiting.   Genitourinary:  Negative for difficulty urinating, dysuria and hematuria.   Musculoskeletal:  Negative for myalgias.   Neurological:  Negative for headaches.   Hematological:  Does not bruise/bleed easily.       The past medical, family and social histories were reviewed for this encounter.     Past Medical History:   Diagnosis Date    Abnormal Pap smear of cervix     years ago- repeat pap    Effingham's disease 2010    Diabetes mellitus type I 1985    age 5 years.    Palpitations     Transfusion history 2017     Past Surgical History:   Procedure Laterality Date    ABDOMINOPLASTY N/A 10/10/2018    Procedure: ABDOMINOPLASTY - Repair of Diastasis with Mesh;  Surgeon: Peña Martin MD;  Location: [facility];  Service: Plastics;  Laterality: N/A;     SECTION      x3    HERNIA REPAIR N/A 10/10/2018    Procedure: REPAIR, HERNIA - Umbilical WITH MESH;  Surgeon: Peña  "ADAM Martin MD;  Location: 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       Social Drivers of Health      Received from [facility]    Housing Stability     Current Outpatient Medications   Medication Sig    BD LUER-LATRICE SYRINGE 3 mL 22 x 1 1/2" Syrg     blood sugar diagnostic Strp Freestyle Lite--pt checks glucose QID    cholecalciferol, vitamin D3, 125 mcg (5,000 unit) Tab Take 5,000 Units by mouth.    fludrocortisone (FLORINEF) 0.1 mg Tab One a day--except may increase to 2 a day with illness--stress.    glucagon 1 mg SolR Glucagon (HCl) Emergency Kit 1 mg solution for injection   Take by injection route.    hydrocortisone (CORTEF) 5 MG Tab hydrocortisone 5 mg tablet   Take 40mg in split doses + extra 10mg on sick days    hydrocortisone sodium succinate (SOLU-CORTEF) 100 mg SolR Hydrocortisone Vial 100 mg. Use PRN adrenal crisis up to Q 8 hours. Supply 3 cc syringe with 22 G 1 inch needle    insulin glargine (LANTUS SOLOSTAR U-100 INSULIN) 100 unit/mL (3 mL) InPn pen 25 units at bedtime in event insulin pump is discontinued.    liothyronine (CYTOMEL) 5 MCG Tab Cytomel 5 mcg tablet   Take 4 tablets with lunch and 2 tablets before bed    NOVOLOG U-100 INSULIN ASPART 100 unit/mL injection Pt uses 60 units a day via insulin.    pen needle, diabetic 31 gauge x 5/16" Ndle To use with insulin pens    SURE COMFORT PEN NEEDLE 32 gauge x 5/32" Ndle      No current facility-administered medications for this visit.     Facility-Administered Medications Ordered in Other Visits   Medication    ceFAZolin injection 2 g    diphenhydrAMINE injection 25 mg " "   hydrocortisone sodium succinate injection 100 mg    lorazepam injection 0.5 mg     /78 (BP Location: Left arm, Patient Position: Sitting)   Pulse 79   SpO2 99%     Objective:      Physical Exam  Vitals reviewed.   Constitutional:       General: She is not in acute distress.     Appearance: She is well-developed.   HENT:      Head: Normocephalic and atraumatic.   Eyes:      General: No scleral icterus.     Conjunctiva/sclera: Conjunctivae normal.   Neck:      Vascular: No JVD.   Cardiovascular:      Rate and Rhythm: Normal rate and regular rhythm.      Heart sounds: Normal heart sounds. No murmur heard.     No friction rub. No gallop.   Pulmonary:      Effort: Pulmonary effort is normal. No respiratory distress.      Breath sounds: Normal breath sounds. No wheezing or rales.   Abdominal:      General: Bowel sounds are normal. There is no distension.      Palpations: Abdomen is soft.      Tenderness: There is no abdominal tenderness.   Musculoskeletal:      Right lower leg: No edema.      Left lower leg: No edema.   Skin:     General: Skin is warm and dry.      Findings: No rash.   Neurological:      Mental Status: She is alert and oriented to person, place, and time.         Assessment:       1. Type 1 diabetes mellitus with complication    2. Jeremias's disease        Lab Results   Component Value Date    CREATININE 0.8 05/29/2019    BUN 11 05/29/2019     05/29/2019    K 4.2 05/29/2019     05/29/2019    CO2 28 05/29/2019     Lab Results   Component Value Date    PTH 73.0 05/29/2019    CALCIUM 9.4 05/29/2019    PHOS 2.9 05/29/2019     Lab Results   Component Value Date    HCT 45.3 03/06/2019     No results found for: "UTPCR"    Plan:   Return to clinic in 12 months.  Labs for next visit include rfp, upc, uac, pth, and renal US.  Baseline creatinine is 0.8, no proteinuria. Will continue to monitor for now, no evidence of diabetic nephropathy currently.  Na/K normal, avoid spironolactone/kerendia " given history of [place]'s disease

## 2024-10-08 ENCOUNTER — HOSPITAL ENCOUNTER (OUTPATIENT)
Dept: RADIOLOGY | Facility: HOSPITAL | Age: 44
Discharge: HOME | End: 2024-10-08
Attending: INTERNAL MEDICINE
Payer: COMMERCIAL

## 2024-10-08 DIAGNOSIS — E10.8 TYPE 1 DIABETES MELLITUS WITH COMPLICATION: ICD-10-CM

## 2024-10-08 PROCEDURE — 76770 US EXAM ABDO BACK WALL COMP: CPT | Mod: 26,,, | Performed by: RADIOLOGY

## 2024-10-08 PROCEDURE — 76770 US EXAM ABDO BACK WALL COMP: CPT | Mod: TC,PO
